# Patient Record
Sex: FEMALE | Race: WHITE | NOT HISPANIC OR LATINO | Employment: UNEMPLOYED | URBAN - METROPOLITAN AREA
[De-identification: names, ages, dates, MRNs, and addresses within clinical notes are randomized per-mention and may not be internally consistent; named-entity substitution may affect disease eponyms.]

---

## 2024-05-14 ENCOUNTER — HOSPITAL ENCOUNTER (INPATIENT)
Facility: HOSPITAL | Age: 59
LOS: 1 days | Discharge: HOME | DRG: 322 | End: 2024-05-15
Attending: EMERGENCY MEDICINE | Admitting: STUDENT IN AN ORGANIZED HEALTH CARE EDUCATION/TRAINING PROGRAM
Payer: COMMERCIAL

## 2024-05-14 ENCOUNTER — CLINICAL SUPPORT (OUTPATIENT)
Dept: CARDIOLOGY | Facility: HOSPITAL | Age: 59
DRG: 322 | End: 2024-05-14
Attending: STUDENT IN AN ORGANIZED HEALTH CARE EDUCATION/TRAINING PROGRAM
Payer: COMMERCIAL

## 2024-05-14 VITALS — BODY MASS INDEX: 36.57 KG/M2 | WEIGHT: 206.38 LBS | HEIGHT: 63 IN

## 2024-05-14 DIAGNOSIS — I21.3 STEMI (ST ELEVATION MYOCARDIAL INFARCTION): Primary | ICD-10-CM

## 2024-05-14 DIAGNOSIS — I25.10 CAD (CORONARY ARTERY DISEASE): ICD-10-CM

## 2024-05-14 DIAGNOSIS — R07.9 CHEST PAIN: ICD-10-CM

## 2024-05-14 PROBLEM — F17.210 CIGARETTE NICOTINE DEPENDENCE WITHOUT COMPLICATION: Status: ACTIVE | Noted: 2024-05-14

## 2024-05-14 PROBLEM — F41.9 ANXIETY AND DEPRESSION: Status: ACTIVE | Noted: 2024-05-14

## 2024-05-14 PROBLEM — M19.90 OSTEOARTHRITIS: Status: ACTIVE | Noted: 2024-05-14

## 2024-05-14 PROBLEM — I10 HTN (HYPERTENSION): Status: ACTIVE | Noted: 2024-05-14

## 2024-05-14 PROBLEM — F32.A ANXIETY AND DEPRESSION: Status: ACTIVE | Noted: 2024-05-14

## 2024-05-14 PROBLEM — E78.5 HLD (HYPERLIPIDEMIA): Status: ACTIVE | Noted: 2024-05-14

## 2024-05-14 LAB
ALBUMIN SERPL BCP-MCNC: 4.1 G/DL (ref 3.5–5.2)
ALP SERPL-CCNC: 88 U/L (ref 55–135)
ALT SERPL W/O P-5'-P-CCNC: 28 U/L (ref 10–44)
ANION GAP SERPL CALC-SCNC: 3 MMOL/L (ref 8–16)
AST SERPL-CCNC: 25 U/L (ref 10–40)
BASOPHILS # BLD AUTO: 0.04 K/UL (ref 0–0.2)
BASOPHILS NFR BLD: 0.4 % (ref 0–1.9)
BILIRUB SERPL-MCNC: 0.3 MG/DL (ref 0.1–1)
BNP SERPL-MCNC: 14 PG/ML (ref 0–99)
BUN SERPL-MCNC: 23 MG/DL (ref 8–23)
CALCIUM SERPL-MCNC: 9 MG/DL (ref 8.7–10.5)
CHLORIDE SERPL-SCNC: 106 MMOL/L (ref 95–110)
CHOLEST SERPL-MCNC: 183 MG/DL (ref 120–199)
CHOLEST/HDLC SERPL: 3.7 {RATIO} (ref 2–5)
CO2 SERPL-SCNC: 30 MMOL/L (ref 23–29)
CREAT SERPL-MCNC: 0.9 MG/DL (ref 0.5–1.4)
DIFFERENTIAL METHOD BLD: ABNORMAL
EOSINOPHIL # BLD AUTO: 0.3 K/UL (ref 0–0.5)
EOSINOPHIL NFR BLD: 3.1 % (ref 0–8)
ERYTHROCYTE [DISTWIDTH] IN BLOOD BY AUTOMATED COUNT: 13.7 % (ref 11.5–14.5)
EST. GFR  (NO RACE VARIABLE): >60 ML/MIN/1.73 M^2
ESTIMATED AVG GLUCOSE: 126 MG/DL (ref 68–131)
GLUCOSE SERPL-MCNC: 115 MG/DL (ref 70–110)
HBA1C MFR BLD: 6 % (ref 4.5–6.2)
HCT VFR BLD AUTO: 43.6 % (ref 37–48.5)
HDLC SERPL-MCNC: 49 MG/DL (ref 40–75)
HDLC SERPL: 26.8 % (ref 20–50)
HGB BLD-MCNC: 13.6 G/DL (ref 12–16)
IMM GRANULOCYTES # BLD AUTO: 0.03 K/UL (ref 0–0.04)
IMM GRANULOCYTES NFR BLD AUTO: 0.3 % (ref 0–0.5)
INR PPP: 0.9 (ref 0.8–1.2)
LDLC SERPL CALC-MCNC: 124.8 MG/DL (ref 63–159)
LYMPHOCYTES # BLD AUTO: 3.3 K/UL (ref 1–4.8)
LYMPHOCYTES NFR BLD: 33.1 % (ref 18–48)
MAGNESIUM SERPL-MCNC: 2.1 MG/DL (ref 1.6–2.6)
MCH RBC QN AUTO: 29.3 PG (ref 27–31)
MCHC RBC AUTO-ENTMCNC: 31.2 G/DL (ref 32–36)
MCV RBC AUTO: 94 FL (ref 82–98)
MONOCYTES # BLD AUTO: 1.2 K/UL (ref 0.3–1)
MONOCYTES NFR BLD: 12 % (ref 4–15)
NEUTROPHILS # BLD AUTO: 5.1 K/UL (ref 1.8–7.7)
NEUTROPHILS NFR BLD: 51.1 % (ref 38–73)
NONHDLC SERPL-MCNC: 134 MG/DL
NRBC BLD-RTO: 0 /100 WBC
PLATELET # BLD AUTO: 247 K/UL (ref 150–450)
PMV BLD AUTO: 10.2 FL (ref 9.2–12.9)
POC ACTIVATED CLOTTING TIME K: 239 SEC (ref 74–137)
POTASSIUM SERPL-SCNC: 4 MMOL/L (ref 3.5–5.1)
PROT SERPL-MCNC: 6.7 G/DL (ref 6–8.4)
PROTHROMBIN TIME: 9.8 SEC (ref 9–12.5)
RBC # BLD AUTO: 4.64 M/UL (ref 4–5.4)
SAMPLE: ABNORMAL
SODIUM SERPL-SCNC: 139 MMOL/L (ref 136–145)
TRIGL SERPL-MCNC: 46 MG/DL (ref 30–150)
TROPONIN I SERPL HS-MCNC: 11.4 PG/ML (ref 0–14.9)
TROPONIN I SERPL HS-MCNC: 7108.2 PG/ML (ref 0–14.9)
TROPONIN I SERPL HS-MCNC: 7108.2 PG/ML (ref 0–14.9)
WBC # BLD AUTO: 10.03 K/UL (ref 3.9–12.7)

## 2024-05-14 PROCEDURE — 92941 PRQ TRLML REVSC TOT OCCL AMI: CPT | Mod: LD,,, | Performed by: INTERNAL MEDICINE

## 2024-05-14 PROCEDURE — 63600175 PHARM REV CODE 636 W HCPCS: Mod: JZ,JG | Performed by: INTERNAL MEDICINE

## 2024-05-14 PROCEDURE — 99152 MOD SED SAME PHYS/QHP 5/>YRS: CPT | Performed by: INTERNAL MEDICINE

## 2024-05-14 PROCEDURE — C1769 GUIDE WIRE: HCPCS | Performed by: INTERNAL MEDICINE

## 2024-05-14 PROCEDURE — 93306 TTE W/DOPPLER COMPLETE: CPT

## 2024-05-14 PROCEDURE — 027034Z DILATION OF CORONARY ARTERY, ONE ARTERY WITH DRUG-ELUTING INTRALUMINAL DEVICE, PERCUTANEOUS APPROACH: ICD-10-PCS | Performed by: INTERNAL MEDICINE

## 2024-05-14 PROCEDURE — 27000221 HC OXYGEN, UP TO 24 HOURS

## 2024-05-14 PROCEDURE — C1874 STENT, COATED/COV W/DEL SYS: HCPCS | Performed by: INTERNAL MEDICINE

## 2024-05-14 PROCEDURE — 83735 ASSAY OF MAGNESIUM: CPT | Performed by: EMERGENCY MEDICINE

## 2024-05-14 PROCEDURE — 94761 N-INVAS EAR/PLS OXIMETRY MLT: CPT

## 2024-05-14 PROCEDURE — B211YZZ FLUOROSCOPY OF MULTIPLE CORONARY ARTERIES USING OTHER CONTRAST: ICD-10-PCS | Performed by: INTERNAL MEDICINE

## 2024-05-14 PROCEDURE — 96375 TX/PRO/DX INJ NEW DRUG ADDON: CPT

## 2024-05-14 PROCEDURE — 99900031 HC PATIENT EDUCATION (STAT)

## 2024-05-14 PROCEDURE — 84484 ASSAY OF TROPONIN QUANT: CPT | Performed by: EMERGENCY MEDICINE

## 2024-05-14 PROCEDURE — C1887 CATHETER, GUIDING: HCPCS | Performed by: INTERNAL MEDICINE

## 2024-05-14 PROCEDURE — 93454 CORONARY ARTERY ANGIO S&I: CPT | Mod: 26,59,51, | Performed by: INTERNAL MEDICINE

## 2024-05-14 PROCEDURE — 25500020 PHARM REV CODE 255: Performed by: INTERNAL MEDICINE

## 2024-05-14 PROCEDURE — 99291 CRITICAL CARE FIRST HOUR: CPT

## 2024-05-14 PROCEDURE — 85610 PROTHROMBIN TIME: CPT | Performed by: EMERGENCY MEDICINE

## 2024-05-14 PROCEDURE — 93005 ELECTROCARDIOGRAM TRACING: CPT | Performed by: INTERNAL MEDICINE

## 2024-05-14 PROCEDURE — 83036 HEMOGLOBIN GLYCOSYLATED A1C: CPT | Performed by: STUDENT IN AN ORGANIZED HEALTH CARE EDUCATION/TRAINING PROGRAM

## 2024-05-14 PROCEDURE — 83880 ASSAY OF NATRIURETIC PEPTIDE: CPT | Performed by: EMERGENCY MEDICINE

## 2024-05-14 PROCEDURE — C1894 INTRO/SHEATH, NON-LASER: HCPCS | Performed by: INTERNAL MEDICINE

## 2024-05-14 PROCEDURE — 25000003 PHARM REV CODE 250: Performed by: STUDENT IN AN ORGANIZED HEALTH CARE EDUCATION/TRAINING PROGRAM

## 2024-05-14 PROCEDURE — 25000242 PHARM REV CODE 250 ALT 637 W/ HCPCS

## 2024-05-14 PROCEDURE — 85025 COMPLETE CBC W/AUTO DIFF WBC: CPT | Performed by: EMERGENCY MEDICINE

## 2024-05-14 PROCEDURE — 63600175 PHARM REV CODE 636 W HCPCS

## 2024-05-14 PROCEDURE — C1725 CATH, TRANSLUMIN NON-LASER: HCPCS | Performed by: INTERNAL MEDICINE

## 2024-05-14 PROCEDURE — 99223 1ST HOSP IP/OBS HIGH 75: CPT | Mod: 25,,, | Performed by: INTERNAL MEDICINE

## 2024-05-14 PROCEDURE — 84484 ASSAY OF TROPONIN QUANT: CPT | Mod: 91 | Performed by: STUDENT IN AN ORGANIZED HEALTH CARE EDUCATION/TRAINING PROGRAM

## 2024-05-14 PROCEDURE — 80061 LIPID PANEL: CPT | Performed by: STUDENT IN AN ORGANIZED HEALTH CARE EDUCATION/TRAINING PROGRAM

## 2024-05-14 PROCEDURE — 20000000 HC ICU ROOM

## 2024-05-14 PROCEDURE — 93306 TTE W/DOPPLER COMPLETE: CPT | Mod: 26,,, | Performed by: INTERNAL MEDICINE

## 2024-05-14 PROCEDURE — 25000003 PHARM REV CODE 250: Performed by: INTERNAL MEDICINE

## 2024-05-14 PROCEDURE — 93454 CORONARY ARTERY ANGIO S&I: CPT | Mod: 59 | Performed by: INTERNAL MEDICINE

## 2024-05-14 PROCEDURE — 99153 MOD SED SAME PHYS/QHP EA: CPT | Performed by: INTERNAL MEDICINE

## 2024-05-14 PROCEDURE — 96374 THER/PROPH/DIAG INJ IV PUSH: CPT

## 2024-05-14 PROCEDURE — 93010 ELECTROCARDIOGRAM REPORT: CPT | Mod: ,,, | Performed by: INTERNAL MEDICINE

## 2024-05-14 PROCEDURE — C9606 PERC D-E COR REVASC W AMI S: HCPCS | Mod: LD | Performed by: INTERNAL MEDICINE

## 2024-05-14 PROCEDURE — 25000242 PHARM REV CODE 250 ALT 637 W/ HCPCS: Performed by: EMERGENCY MEDICINE

## 2024-05-14 PROCEDURE — 99152 MOD SED SAME PHYS/QHP 5/>YRS: CPT | Mod: ,,, | Performed by: INTERNAL MEDICINE

## 2024-05-14 PROCEDURE — C1760 CLOSURE DEV, VASC: HCPCS | Performed by: INTERNAL MEDICINE

## 2024-05-14 PROCEDURE — 36415 COLL VENOUS BLD VENIPUNCTURE: CPT | Performed by: STUDENT IN AN ORGANIZED HEALTH CARE EDUCATION/TRAINING PROGRAM

## 2024-05-14 PROCEDURE — 80053 COMPREHEN METABOLIC PANEL: CPT | Performed by: EMERGENCY MEDICINE

## 2024-05-14 PROCEDURE — 27201423 OPTIME MED/SURG SUP & DEVICES STERILE SUPPLY: Performed by: INTERNAL MEDICINE

## 2024-05-14 DEVICE — STENT ONYXNG25030UX ONYX 2.50X30RX
Type: IMPLANTABLE DEVICE | Site: CORONARY | Status: FUNCTIONAL
Brand: ONYX FRONTIER™

## 2024-05-14 DEVICE — ANGIO-SEAL VIP VASCULAR CLOSURE DEVICE
Type: IMPLANTABLE DEVICE | Site: CORONARY | Status: FUNCTIONAL
Brand: ANGIO-SEAL

## 2024-05-14 RX ORDER — CLOPIDOGREL BISULFATE 75 MG/1
75 TABLET ORAL DAILY
Status: DISCONTINUED | OUTPATIENT
Start: 2024-05-15 | End: 2024-05-15 | Stop reason: HOSPADM

## 2024-05-14 RX ORDER — CARVEDILOL 25 MG/1
25 TABLET ORAL 2 TIMES DAILY
COMMUNITY

## 2024-05-14 RX ORDER — OXYCODONE AND ACETAMINOPHEN 10; 325 MG/1; MG/1
1 TABLET ORAL EVERY 8 HOURS
COMMUNITY

## 2024-05-14 RX ORDER — ONDANSETRON HYDROCHLORIDE 2 MG/ML
4 INJECTION, SOLUTION INTRAVENOUS EVERY 12 HOURS PRN
Status: DISCONTINUED | OUTPATIENT
Start: 2024-05-14 | End: 2024-05-15 | Stop reason: HOSPADM

## 2024-05-14 RX ORDER — LIDOCAINE HYDROCHLORIDE 10 MG/ML
INJECTION INFILTRATION; PERINEURAL
Status: DISCONTINUED | OUTPATIENT
Start: 2024-05-14 | End: 2024-05-14

## 2024-05-14 RX ORDER — PHENTERMINE HYDROCHLORIDE 37.5 MG/1
37.5 TABLET ORAL DAILY
Status: ON HOLD | COMMUNITY
End: 2024-05-15 | Stop reason: HOSPADM

## 2024-05-14 RX ORDER — ALUMINUM HYDROXIDE, MAGNESIUM HYDROXIDE, AND SIMETHICONE 1200; 120; 1200 MG/30ML; MG/30ML; MG/30ML
30 SUSPENSION ORAL EVERY 6 HOURS PRN
Status: DISCONTINUED | OUTPATIENT
Start: 2024-05-14 | End: 2024-05-15 | Stop reason: HOSPADM

## 2024-05-14 RX ORDER — NITROGLYCERIN 5 MG/ML
INJECTION, SOLUTION INTRAVENOUS
Status: DISCONTINUED | OUTPATIENT
Start: 2024-05-14 | End: 2024-05-14

## 2024-05-14 RX ORDER — FENTANYL CITRATE 50 UG/ML
INJECTION, SOLUTION INTRAMUSCULAR; INTRAVENOUS
Status: DISCONTINUED | OUTPATIENT
Start: 2024-05-14 | End: 2024-05-14

## 2024-05-14 RX ORDER — ONDANSETRON HYDROCHLORIDE 2 MG/ML
INJECTION, SOLUTION INTRAVENOUS
Status: COMPLETED
Start: 2024-05-14 | End: 2024-05-14

## 2024-05-14 RX ORDER — MIDAZOLAM HYDROCHLORIDE 1 MG/ML
INJECTION INTRAMUSCULAR; INTRAVENOUS
Status: DISCONTINUED | OUTPATIENT
Start: 2024-05-14 | End: 2024-05-14

## 2024-05-14 RX ORDER — PREGABALIN 25 MG/1
50 CAPSULE ORAL 2 TIMES DAILY
Status: DISCONTINUED | OUTPATIENT
Start: 2024-05-14 | End: 2024-05-15 | Stop reason: HOSPADM

## 2024-05-14 RX ORDER — SUMATRIPTAN SUCCINATE 100 MG/1
50 TABLET ORAL
COMMUNITY

## 2024-05-14 RX ORDER — ACETAMINOPHEN 325 MG/1
650 TABLET ORAL EVERY 4 HOURS PRN
Status: DISCONTINUED | OUTPATIENT
Start: 2024-05-14 | End: 2024-05-14

## 2024-05-14 RX ORDER — ACETAMINOPHEN 325 MG/1
650 TABLET ORAL EVERY 6 HOURS PRN
Status: DISCONTINUED | OUTPATIENT
Start: 2024-05-14 | End: 2024-05-15 | Stop reason: HOSPADM

## 2024-05-14 RX ORDER — SODIUM CHLORIDE 9 MG/ML
INJECTION, SOLUTION INTRAVENOUS
Status: DISCONTINUED | OUTPATIENT
Start: 2024-05-14 | End: 2024-05-14

## 2024-05-14 RX ORDER — HEPARIN SODIUM 10000 [USP'U]/ML
INJECTION, SOLUTION INTRAVENOUS; SUBCUTANEOUS
Status: DISCONTINUED | OUTPATIENT
Start: 2024-05-14 | End: 2024-05-14

## 2024-05-14 RX ORDER — MUPIROCIN 20 MG/G
OINTMENT TOPICAL 2 TIMES DAILY
Status: DISCONTINUED | OUTPATIENT
Start: 2024-05-14 | End: 2024-05-15 | Stop reason: HOSPADM

## 2024-05-14 RX ORDER — PREGABALIN 50 MG/1
50 CAPSULE ORAL 2 TIMES DAILY
COMMUNITY

## 2024-05-14 RX ORDER — CYCLOBENZAPRINE HCL 5 MG
10 TABLET ORAL EVERY 8 HOURS PRN
Status: DISCONTINUED | OUTPATIENT
Start: 2024-05-14 | End: 2024-05-14

## 2024-05-14 RX ORDER — PANTOPRAZOLE SODIUM 40 MG/1
40 TABLET, DELAYED RELEASE ORAL DAILY
Status: DISCONTINUED | OUTPATIENT
Start: 2024-05-14 | End: 2024-05-15 | Stop reason: HOSPADM

## 2024-05-14 RX ORDER — ASPIRIN 81 MG/1
81 TABLET ORAL DAILY
Status: DISCONTINUED | OUTPATIENT
Start: 2024-05-15 | End: 2024-05-15 | Stop reason: HOSPADM

## 2024-05-14 RX ORDER — DULOXETIN HYDROCHLORIDE 60 MG/1
60 CAPSULE, DELAYED RELEASE ORAL 2 TIMES DAILY
COMMUNITY

## 2024-05-14 RX ORDER — NITROGLYCERIN 0.4 MG/1
TABLET SUBLINGUAL
Status: COMPLETED
Start: 2024-05-14 | End: 2024-05-14

## 2024-05-14 RX ORDER — NITROGLYCERIN 0.4 MG/1
0.4 TABLET SUBLINGUAL EVERY 5 MIN PRN
Status: DISCONTINUED | OUTPATIENT
Start: 2024-05-14 | End: 2024-05-15 | Stop reason: HOSPADM

## 2024-05-14 RX ORDER — CELECOXIB 200 MG/1
200 CAPSULE ORAL 2 TIMES DAILY
Status: ON HOLD | COMMUNITY
End: 2024-05-15 | Stop reason: HOSPADM

## 2024-05-14 RX ORDER — DULOXETIN HYDROCHLORIDE 30 MG/1
60 CAPSULE, DELAYED RELEASE ORAL 2 TIMES DAILY
Status: DISCONTINUED | OUTPATIENT
Start: 2024-05-14 | End: 2024-05-15 | Stop reason: HOSPADM

## 2024-05-14 RX ORDER — ATORVASTATIN CALCIUM 40 MG/1
40 TABLET, FILM COATED ORAL NIGHTLY
Status: DISCONTINUED | OUTPATIENT
Start: 2024-05-14 | End: 2024-05-15 | Stop reason: HOSPADM

## 2024-05-14 RX ORDER — CYCLOBENZAPRINE HCL 10 MG
10 TABLET ORAL EVERY 8 HOURS PRN
COMMUNITY

## 2024-05-14 RX ORDER — SPIRONOLACTONE 25 MG/1
25 TABLET ORAL DAILY
COMMUNITY

## 2024-05-14 RX ORDER — ATORVASTATIN CALCIUM 20 MG/1
20 TABLET, FILM COATED ORAL DAILY
COMMUNITY

## 2024-05-14 RX ORDER — MORPHINE SULFATE 4 MG/ML
INJECTION, SOLUTION INTRAMUSCULAR; INTRAVENOUS
Status: COMPLETED
Start: 2024-05-14 | End: 2024-05-14

## 2024-05-14 RX ORDER — MORPHINE SULFATE 4 MG/ML
4 INJECTION, SOLUTION INTRAMUSCULAR; INTRAVENOUS
Status: COMPLETED | OUTPATIENT
Start: 2024-05-14 | End: 2024-05-14

## 2024-05-14 RX ORDER — CARVEDILOL 12.5 MG/1
25 TABLET ORAL 2 TIMES DAILY
Status: DISCONTINUED | OUTPATIENT
Start: 2024-05-14 | End: 2024-05-15 | Stop reason: HOSPADM

## 2024-05-14 RX ORDER — SPIRONOLACTONE 25 MG/1
25 TABLET ORAL DAILY
Status: DISCONTINUED | OUTPATIENT
Start: 2024-05-14 | End: 2024-05-15 | Stop reason: HOSPADM

## 2024-05-14 RX ORDER — IODIXANOL 320 MG/ML
INJECTION, SOLUTION INTRAVASCULAR
Status: DISCONTINUED | OUTPATIENT
Start: 2024-05-14 | End: 2024-05-14

## 2024-05-14 RX ORDER — SODIUM CHLORIDE 450 MG/100ML
INJECTION, SOLUTION INTRAVENOUS CONTINUOUS
Status: ACTIVE | OUTPATIENT
Start: 2024-05-14 | End: 2024-05-14

## 2024-05-14 RX ORDER — FAMOTIDINE 20 MG/1
20 TABLET, FILM COATED ORAL 2 TIMES DAILY
Status: DISCONTINUED | OUTPATIENT
Start: 2024-05-14 | End: 2024-05-14

## 2024-05-14 RX ORDER — ONDANSETRON HYDROCHLORIDE 2 MG/ML
4 INJECTION, SOLUTION INTRAVENOUS
Status: COMPLETED | OUTPATIENT
Start: 2024-05-14 | End: 2024-05-14

## 2024-05-14 RX ORDER — VALSARTAN 80 MG/1
160 TABLET ORAL DAILY
Status: DISCONTINUED | OUTPATIENT
Start: 2024-05-14 | End: 2024-05-15 | Stop reason: HOSPADM

## 2024-05-14 RX ORDER — NITROGLYCERIN 0.4 MG/1
0.4 TABLET SUBLINGUAL EVERY 5 MIN PRN
Status: DISCONTINUED | OUTPATIENT
Start: 2024-05-14 | End: 2024-05-14

## 2024-05-14 RX ORDER — CLOPIDOGREL BISULFATE 75 MG/1
TABLET ORAL
Status: DISCONTINUED | OUTPATIENT
Start: 2024-05-14 | End: 2024-05-14

## 2024-05-14 RX ORDER — CELECOXIB 100 MG/1
200 CAPSULE ORAL 2 TIMES DAILY
Status: DISCONTINUED | OUTPATIENT
Start: 2024-05-14 | End: 2024-05-14

## 2024-05-14 RX ORDER — VALSARTAN 160 MG/1
160 TABLET ORAL DAILY
COMMUNITY

## 2024-05-14 RX ORDER — OMEPRAZOLE 40 MG/1
40 CAPSULE, DELAYED RELEASE ORAL EVERY MORNING
COMMUNITY

## 2024-05-14 RX ADMIN — NITROGLYCERIN 0.4 MG: 0.4 TABLET SUBLINGUAL at 04:05

## 2024-05-14 RX ADMIN — MORPHINE SULFATE 4 MG: 4 INJECTION, SOLUTION INTRAMUSCULAR; INTRAVENOUS at 04:05

## 2024-05-14 RX ADMIN — ONDANSETRON HYDROCHLORIDE 4 MG: 2 INJECTION, SOLUTION INTRAVENOUS at 04:05

## 2024-05-14 RX ADMIN — DULOXETINE HYDROCHLORIDE 60 MG: 30 CAPSULE, DELAYED RELEASE ORAL at 08:05

## 2024-05-14 RX ADMIN — FAMOTIDINE 20 MG: 20 TABLET ORAL at 08:05

## 2024-05-14 RX ADMIN — PREGABALIN 50 MG: 25 CAPSULE ORAL at 08:05

## 2024-05-14 RX ADMIN — SODIUM CHLORIDE: 0.45 INJECTION, SOLUTION INTRAVENOUS at 08:05

## 2024-05-14 RX ADMIN — MUPIROCIN 1 G: 20 OINTMENT TOPICAL at 08:05

## 2024-05-14 RX ADMIN — ACETAMINOPHEN 650 MG: 325 TABLET ORAL at 10:05

## 2024-05-14 RX ADMIN — ATORVASTATIN CALCIUM 40 MG: 40 TABLET, FILM COATED ORAL at 08:05

## 2024-05-14 RX ADMIN — ONDANSETRON 4 MG: 2 INJECTION INTRAMUSCULAR; INTRAVENOUS at 04:05

## 2024-05-14 RX ADMIN — CARVEDILOL 25 MG: 12.5 TABLET, FILM COATED ORAL at 08:05

## 2024-05-14 NOTE — Clinical Note
The catheter was inserted over the wire into the ostium   left main. An angiography was performed of the left coronary arteries. Multiple views were taken. The angiography was performed via hand injection with 10 mL of contrast.  Contrast estimated

## 2024-05-14 NOTE — PLAN OF CARE
[facility]  Initial Discharge Assessment    Assessment completed at bedside with Pt, and all information on FaceSheet confirmed, including demographics, PCP, pharmacy and insurance. Pt has not addressed advance directives. She currently lives with her spouse in [place]. Her PCP is Jesse Leggett MD, and last appointment was three weeks ago. Her preferred pharmacy is [facility] in [place]. She denies any HH/HD/Blood Thinners but she does use a walker and a cane. For transportation to appointments, she usually drives herself but her sister-in-law will transport her back home at discharge. She denies any recent hospitalizations. Plan for discharge is to return home. Case Management to continue to follow for discharge planning needs.          Primary Care Provider: Jesse Leggett MD    Admission Diagnosis: STEMI (ST elevation myocardial infarction) [I21.3]  Chest pain [R07.9]    Admission Date: 5/14/2024  Expected Discharge Date: 5/15/2024    Transition of Care Barriers: (P) None    Payor: CIGNA / Plan: CIGNA EPO / Product Type: PPO /     Extended Emergency Contact Information  Primary Emergency Contact: AnnaDavid  Address: [address] [place] [id number] [facility]  Home Phone: [phone]  Mobile Phone: [phone]  Relation: Spouse  Preferred language: English   needed? No  Secondary Emergency Contact: Jean-Pierre Sam  Home Phone: [phone]  Mobile Phone: [phone]  Relation: Son   needed? No    Discharge Plan A: (P) Home with family  Discharge Plan B: (P) Home with family    No Pharmacies Listed    Initial Assessment (most recent)       Adult Discharge Assessment - 05/14/24 1444          Discharge Assessment    Assessment Type Discharge Planning Assessment (P)      Confirmed/corrected address, phone number and insurance Yes (P)      Confirmed Demographics Correct on Facesheet (P)      Source of Information patient (P)      When was  your last doctors appointment? -- (P)    Three weeks ago    Does patient/caregiver understand observation status Yes (P)      Reason For Admission STEMI (P)      People in Home spouse (P)      Do you expect to return to your current living situation? Yes (P)      Do you have help at home or someone to help you manage your care at home? Yes (P)      Who are your caregiver(s) and their phone number(s)? David Anna (Spouse)  [phone] (Mobile) (P)      Prior to hospitilization cognitive status: Alert/Oriented (P)      Current cognitive status: Alert/Oriented (P)      Walking or Climbing Stairs Difficulty yes (P)      Walking or Climbing Stairs ambulation difficulty, requires equipment;stair climbing difficulty, requires equipment (P)      Dressing/Bathing Difficulty no (P)      Home Accessibility not wheelchair accessible (P)      Home Layout Able to live on 1st floor (P)      Equipment Currently Used at Home walker, standard;cane, straight (P)      Readmission within 30 days? No (P)      Patient currently being followed by outpatient case management? No (P)      Do you currently have service(s) that help you manage your care at home? No (P)      Do you take prescription medications? Yes (P)      Do you have prescription coverage? Yes (P)      Coverage CIG[employer] - CIGNA EPO (P)      Do you have any problems affording any of your prescribed medications? No (P)      Is the patient taking medications as prescribed? yes (P)      Who is going to help you get home at discharge? David Anna (Spouse)  [phone] (Mobile) (P)      Are you on dialysis? No (P)      Do you take coumadin? No (P)      Discharge Plan A Home with family (P)      Discharge Plan B Home with family (P)      DME Needed Upon Discharge  none (P)      Discharge Plan discussed with: Patient (P)      Transition of Care Barriers None (P)         OTHER    Name(s) of People in Home ShoshanaDavid (Spouse)  [phone] (Mobile) (P)

## 2024-05-14 NOTE — ASSESSMENT & PLAN NOTE
S/p stent to LAD  - post-cath care  - tele  - start DAPT and increased statin  - continue coreg and valsartan  - cardiology following

## 2024-05-14 NOTE — CARE UPDATE
05/14/24 0816   Patient Assessment/Suction   Level of Consciousness (AVPU) alert   Respiratory Effort Normal;Unlabored   Expansion/Accessory Muscles/Retractions no use of accessory muscles   All Lung Fields Breath Sounds diminished   Rhythm/Pattern, Respiratory unlabored   Cough Frequency no cough   PRE-TX-O2   Device (Oxygen Therapy) room air   SpO2 98 %   Pulse Oximetry Type Intermittent   $ Pulse Oximetry - Multiple Charge Pulse Oximetry - Multiple   Oximetry Probe Status Assessed   Pulse 76   Resp (!) 21   Education   $ Education DME Oxygen;15 min

## 2024-05-14 NOTE — CONSULTS
[facility]  Cardiology  Consult Note    Patient Name: Sherry Anna  MRN: [id number]  Admission Date: 5/14/2024  Hospital Length of Stay: 0 days  Code Status: No Order   Attending Provider: Isai Coffman MD  Consulting Provider: Isai Coffman MD  Primary Care Physician: Jesse Leggett MD  Principal Problem:<principal problem not specified>    Patient information was obtained from parent and ER records.     Consults  Subjective:     Chief Complaint:  Chest pain    HPI:  Ms. Anna is a 59-year-old female with history of hypertension, diabetes, tobacco addiction.  She has been complaining of intermittent episodes of chest pain that at times occurs at rest.  She was evaluated by a cardiologist in [place], [place].  A stress test was performed a proximally 3 weeks ago it was negative for ischemia.  She woke up with severe chest pain at around 1:00 a.m. she called the family members and she was brought into the emergency room.  She was noted to have ST-elevation in the anterior leads with ST depression in the inferior leads.  She was taking immediately to the cardiac catheterization table the left anterior descending artery had a 99% occlusion a 2.5 x 30 mm stent was deployed in the LAD with good results.  The ostium of the LAD has a proximally 50% stenosis.  The patient's symptoms have resolved.    No past medical history on file.    No past surgical history on file.    Review of patient's allergies indicates:   Allergen Reactions    Codeine Itching       No current facility-administered medications on file prior to encounter.     No current outpatient medications on file prior to encounter.     Family History    None       Tobacco Use    Smoking status: Not on file    Smokeless tobacco: Not on file   Substance and Sexual Activity    Alcohol use: Not on file    Drug use: Not on file    Sexual activity: Not on file     Review of Systems   Cardiovascular:  Positive for chest pain and  dyspnea on exertion.   Respiratory:  Negative for cough and shortness of breath.    Hematologic/Lymphatic: Negative for bleeding problem. Does not bruise/bleed easily.     Objective:     Vital Signs (Most Recent):  Temp: 97.8 °F (36.6 °C) (05/14/24 0423)  Pulse: 79 (05/14/24 0445)  Resp: 18 (05/14/24 0438)  BP: 116/62 (05/14/24 0445)  SpO2: 100 % (05/14/24 0445) Vital Signs (24h Range):  Temp:  [97.8 °F (36.6 °C)] 97.8 °F (36.6 °C)  Pulse:  [75-79] 79  Resp:  [18-20] 18  SpO2:  [100 %] 100 %  BP: (116-155)/(62-85) 116/62     Weight: 86.2 kg (190 lb)  Body mass index is 33.66 kg/m².    SpO2: 100 %       No intake or output data in the 24 hours ending 05/14/24 0653    Lines/Drains/Airways       Peripheral Intravenous Line  Duration                  Peripheral IV - Single Lumen 05/14/24 0417 20 G Left Antecubital <1 day         Peripheral IV - Single Lumen 05/14/24 0434 20 G Right Antecubital <1 day                    Physical Exam  Vitals and nursing note reviewed.   Constitutional:       Appearance: She is well-developed. She is obese.      Comments: Overweight female that appears to be in pain   HENT:      Head: Normocephalic and atraumatic.   Eyes:      Conjunctiva/sclera: Conjunctivae normal.   Cardiovascular:      Rate and Rhythm: Normal rate and regular rhythm.      Heart sounds: Normal heart sounds.   Pulmonary:      Effort: Pulmonary effort is normal.      Breath sounds: Normal breath sounds.   Abdominal:      General: Bowel sounds are normal.      Palpations: Abdomen is soft.   Musculoskeletal:         General: Normal range of motion.   Skin:     General: Skin is warm and dry.   Neurological:      Mental Status: She is alert and oriented to person, place, and time.   Psychiatric:         Behavior: Behavior normal.         Thought Content: Thought content normal.         Judgment: Judgment normal.       Significant Labs: CMP   Recent Labs   Lab 05/14/24 0433      K 4.0      CO2 30*   *    BUN 23   CREATININE 0.9   CALCIUM 9.0   PROT 6.7   ALBUMIN 4.1   BILITOT 0.3   ALKPHOS 88   AST 25   ALT 28   ANIONGAP 3*   , CBC   Recent Labs   Lab 05/14/24  0433   WBC 10.03   HGB 13.6   HCT 43.6      , and   Recent Lab Results         05/14/24  0628 05/14/24  0433        Albumin   4.1       ALP   88       ALT   28       Anion Gap   3       AST   25       Baso #   0.04       Basophil %   0.4       BILIRUBIN TOTAL   0.3  Comment: For infants and newborns, interpretation of results should be based  on gestational age, weight and in agreement with clinical  observations.    Premature Infant recommended reference ranges:  Up to 24 hours.............<8.0 mg/dL  Up to 48 hours............<12.0 mg/dL  3-5 days..................<15.0 mg/dL  6-29 days.................<15.0 mg/dL         BNP   14  Comment: Values of less than 100 pg/ml are consistent with non-CHF populations.       BUN   23       Calcium   9.0       Chloride   106       CO2   30       Creatinine   0.9       Differential Method   Automated       eGFR   >60.0       Eos #   0.3       Eos %   3.1       Glucose   115       Gran # (ANC)   5.1       Gran %   51.1       Hematocrit   43.6       Hemoglobin   13.6       Immature Grans (Abs)   0.03  Comment: Mild elevation in immature granulocytes is non specific and   can be seen in a variety of conditions including stress response,   acute inflammation, trauma and pregnancy. Correlation with other   laboratory and clinical findings is essential.         Immature Granulocytes   0.3       INR   0.9  Comment: Coumadin Therapy:  2.0 - 3.0 for INR for all indicators except mechanical heart valves  and antiphospholipid syndromes which should use 2.5 - 3.5.         Lymph #   3.3       Lymph %   33.1       Magnesium    2.1       MCH   29.3       MCHC   31.2       MCV   94       Mono #   1.2       Mono %   12.0       MPV   10.2       nRBC   0       Platelet Count   247       POC ACTIVATED CLOTTING TIME K 239          Potassium   4.0       PROTEIN TOTAL   6.7       PT   9.8       RBC   4.64       RDW   13.7       Sample ARTERIAL         Sodium   139       Troponin I High Sensitivity   11.4  Comment: Troponin results differ between methods. Do not use   results between Troponin methods interchangeably.    Alkaline Phospatase levels above 400 U/L may   cause false positive results.    Access hsTnI should not be used for patients taking   Asfotase rani (Strensiq).         WBC   10.03               Significant Imaging: X-Ray: CXR: X-Ray Chest 1 View (CXR): No results found for this visit on 05/14/24.  Assessment and Plan:   STEMI  Coronary artery disease subtotal occlusion of the midportion of the left anterior descending artery  Status post percutaneous revascularization of the LAD  Hypertension  Tobacco dependence  There are no hospital problems to display for this patient.      VTE Risk Mitigation (From admission, onward)           Ordered     heparin (porcine) injection  As needed (PRN)         05/14/24 0551                Continue routine post intervention.  Dual antiplatelets for a year evaluate for ACE inhibitor beta-blockers and statins for secondary prevention    Thank you for your consult. I will follow-up with patient. Please contact us if you have any additional questions.    Isai Coffman MD  Cardiology   Replaced by [facility]

## 2024-05-14 NOTE — ED PROVIDER NOTES
Encounter Date: 5/14/2024       History     Chief Complaint   Patient presents with    Chest Pain    Nausea     Pt presents to ED c/o chest pain, n/v since 0200 that woke her up from her sleep. Given 4mg IV morphine, 1SL Nitro, 4mg IV Zofran via EMS pta. Pt took (4) 81mg ASA      Chief complaint is midsternal chest pain.  She can not describe it clearly but states it is very severe.  It radiates to left arm and started at 2:00 a.m..  It woke her up from sleep.  She took 4 baby aspirin and EN route EMS gave her 4 mg of morphine 4 mg of Zofran.  On arrival here her chest pain is 10/10.  She denies any history of MI. she does have a history of hypertension.  She also has a history of smoking and has high cholesterol.        Review of patient's allergies indicates:   Allergen Reactions    Codeine Itching     No past medical history on file.  No past surgical history on file.  No family history on file.     Review of Systems   Constitutional:  Negative for chills and fever.   HENT:  Negative for ear pain, rhinorrhea and sore throat.    Eyes:  Negative for pain and visual disturbance.   Respiratory:  Negative for cough and shortness of breath.    Cardiovascular:  Positive for chest pain. Negative for palpitations.   Gastrointestinal:  Negative for abdominal pain, constipation, diarrhea, nausea and vomiting.   Genitourinary:  Negative for dysuria, frequency, hematuria and urgency.   Musculoskeletal:  Negative for back pain, joint swelling and myalgias.   Skin:  Negative for rash.   Neurological:  Negative for dizziness, seizures, weakness and headaches.   Psychiatric/Behavioral:  Negative for dysphoric mood. The patient is not nervous/anxious.        Physical Exam     Initial Vitals [05/14/24 0423]   BP Pulse Resp Temp SpO2   133/84 75 20 97.8 °F (36.6 °C) 100 %      MAP       --         Physical Exam    Nursing note and vitals reviewed.  Constitutional: She appears well-developed and well-nourished.   HENT:   Head:  Normocephalic and atraumatic.   Eyes: Conjunctivae, EOM and lids are normal. Pupils are equal, round, and reactive to light.   Neck: Trachea normal. Neck supple. No thyroid mass and no thyromegaly present.   Normal range of motion.  Cardiovascular:  Normal rate, regular rhythm and normal heart sounds.           Pulmonary/Chest: Effort normal and breath sounds normal.   Abdominal: Abdomen is soft. There is no abdominal tenderness.   Musculoskeletal:         General: Normal range of motion.      Cervical back: Normal range of motion and neck supple.     Neurological: She is alert and oriented to person, place, and time. She has normal strength and normal reflexes. No cranial nerve deficit or sensory deficit.   Skin: Skin is warm and dry.   Psychiatric: She has a normal mood and affect. Her speech is normal and behavior is normal. Judgment and thought content normal.         ED Course   Procedures  Labs Reviewed   CBC W/ AUTO DIFFERENTIAL - Abnormal; Notable for the following components:       Result Value    MCHC 31.2 (*)     Mono # 1.2 (*)     All other components within normal limits   COMPREHENSIVE METABOLIC PANEL - Abnormal; Notable for the following components:    CO2 30 (*)     Glucose 115 (*)     Anion Gap 3 (*)     All other components within normal limits   B-TYPE NATRIURETIC PEPTIDE   MAGNESIUM   TROPONIN I HIGH SENSITIVITY   PROTIME-INR        ECG Results              EKG 12-lead (In process)        Collection Time Result Time QRS Duration OHS QTC Calculation    05/14/24 04:25:37 05/14/24 05:10:00 94 431                     In process by Interface, Lab In [facility] (05/14/24 05:10:07)                   Narrative:    Test Reason : R07.9,    Vent. Rate : 072 BPM     Atrial Rate : 072 BPM     P-R Int : 162 ms          QRS Dur : 094 ms      QT Int : 394 ms       P-R-T Axes : 033 068 016 degrees     QTc Int : 431 ms    Normal sinus rhythm  ST elevation consider anterior injury or acute infarct    ACUTE MI / STEMI     Abnormal ECG  When compared with ECG of 14-MAY-2024 04:24,  No significant change was found    Referred By:             Confirmed By:                                   Imaging Results              X-Ray Chest AP Portable (Final result)  Result time 05/14/24 04:58:15      Final result by Ama Rao MD (05/14/24 04:58:15)                   Impression:      Please see above.      Electronically signed by: Ama Rao MD  Date:    05/14/2024  Time:    04:58               Narrative:    EXAMINATION:  XR CHEST AP PORTABLE    CLINICAL HISTORY:  chest pain;    TECHNIQUE:  Single frontal view of the chest was performed.    COMPARISON:  None    FINDINGS:  Cardiac monitoring leads and cardiac pacing/defibrillator pads project over the chest.  There is mild prominence of the cardiomediastinal silhouette.  Mediastinal structures are midline.  Are symmetrically expanded without evidence of confluent airspace consolidation.  No substantial volume of pleural fluid or pneumothorax identified.  Osseous structures intact with degenerative change and postoperative change of the lower cervical spine.                                       Medications   0.45% NaCl infusion (0 mL/hr Intravenous Stopped 5/14/24 1600)   nitroGLYCERIN SL tablet 0.4 mg (has no administration in time range)   ondansetron injection 4 mg (has no administration in time range)   clopidogreL tablet 75 mg (has no administration in time range)   aspirin EC tablet 81 mg (has no administration in time range)   acetaminophen tablet 650 mg (650 mg Oral Given 5/14/24 2239)   mupirocin 2 % ointment (1 g Nasal Given 5/14/24 2025)   aluminum-magnesium hydroxide-simethicone 200-200-20 mg/5 mL suspension 30 mL (has no administration in time range)   atorvastatin tablet 40 mg (40 mg Oral Given 5/14/24 2025)   carvediloL tablet 25 mg (25 mg Oral Given 5/14/24 2025)   DULoxetine DR capsule 60 mg (60 mg Oral Given 5/14/24 2025)   pantoprazole EC tablet 40 mg (0 mg Oral Hold  5/14/24 1230)   pregabalin capsule 50 mg (50 mg Oral Given 5/14/24 2025)   valsartan tablet 160 mg (160 mg Oral Not Given 5/14/24 1230)   spironolactone tablet 25 mg (0 mg Oral Hold 5/14/24 1230)   ondansetron injection 4 mg (4 mg Intravenous Given 5/14/24 0438)   morphine injection 4 mg (4 mg Intravenous Given 5/14/24 0445)     Medical Decision Making  The patient had an EKG which shows what appears to be an acute MI.  She is deep T-wave inversion 2 3 and AVF with ST elevation V1 V2 V3.  The patient has continued pain after morphine here along with sublingual nitroglycerin.  The case was discussed an EKG was sent to Dr. Meghna crespo the cardiologist on-call.  He agrees with me and because she has continued chest pain will take to the cath lab.  Cath lab was called at 4:40 a.m..  Chest x-ray shows no gross abnormality.  Patient has no allergies to dye.    Amount and/or Complexity of Data Reviewed  Labs: ordered.  Radiology: ordered.    Risk  Prescription drug management.              Attending Attestation:         Attending Critical Care:   Critical Care Times:   Direct Patient Care (initial evaluation, reassessments, and time considering the case)................................................................15 minutes.   Ordering, Reviewing, and Interpreting Diagnostic Studies...............................................................................................................10 minutes.   Documentation..................................................................................................................................................................................5 minutes.   Consultation with other Physicians. .................................................................................................................................................5 minutes.   ==============================================================  Total Critical Care Time - exclusive of procedural time: 35  minutes.  ==============================================================  Critical care was necessary to treat or prevent imminent or life-threatening deterioration of the following conditions: myocardial infarction.   The following critical care procedures were done by me (see procedure notes): pulse oximetry.   Critical care was time spent personally by me on the following activities: examination of patient, ordering lab, x-rays, and/or EKG, evaluation of patient's response to treatment, discussion with consultants and re-evaluation of patient's conition.   Critical Care Condition: critical                                  Clinical Impression:  Final diagnoses:  [R07.9] Chest pain                 Varun Ruiz MD  05/15/24 0521

## 2024-05-14 NOTE — NURSING
Nurses Note -- 4 Eyes      5/14/2024   8:43 AM      Skin assessed during: Admit      [x] No Altered Skin Integrity Present    [x]Prevention Measures Documented      [] Yes- Altered Skin Integrity Present or Discovered   [] LDA Added if Not in Epic (Describe Wound)   [] New Altered Skin Integrity was Present on Admit and Documented in LDA   [] Wound Image Taken    Wound Care Consulted? No    Attending Nurse:  Laxmi Miller RN/Staff Member:   Spring NIÑO

## 2024-05-14 NOTE — H&P
[facility] Medicine  History & Physical    Patient Name: Sherry Anna  MRN: [id number]  Patient Class: IP- Inpatient  Admission Date: 5/14/2024  Attending Physician: Daniel Dotson MD   Primary Care Provider: Jesse Leggett MD         Patient information was obtained from patient, relative(s), past medical records, and ER records.     Subjective:     Principal Problem:STEMI (ST elevation myocardial infarction)    Chief Complaint:   Chief Complaint   Patient presents with    Chest Pain    Nausea     Pt presents to ED c/o chest pain, n/v since 0200 that woke her up from her sleep. Given 4mg IV morphine, 1SL Nitro, 4mg IV Zofran via EMS pta. Pt took (4) 81mg ASA         HPI: 59F with PMH HTN, HLD, GERD, anxiety/depression, and osteoarthritis presented for chest pain that woke her from sleep found to have STEMI. She was taken emergently to cath lab and had a stent placed to her LAD where there was a 99% blockage. There were other stenoses reported as well to be managed medically. She had great resolution of symptoms after stent was placed. She is seen post-cath in ICU reporting feeling well with family bedside. Admitted to hospital medicine for continued management.      No past surgical history on file.    Review of patient's allergies indicates:   Allergen Reactions    Codeine Itching       No current facility-administered medications on file prior to encounter.     Current Outpatient Medications on File Prior to Encounter   Medication Sig    atorvastatin (LIPITOR) 20 MG tablet Take 20 mg by mouth once daily.    carvediloL (COREG) 25 MG tablet Take 25 mg by mouth 2 (two) times daily.    celecoxib (CELEBREX) 200 MG capsule Take 200 mg by mouth 2 (two) times daily.    cyclobenzaprine (FLEXERIL) 10 MG tablet Take 10 mg by mouth every 8 (eight) hours as needed.    DULoxetine (CYMBALTA) 60 MG capsule Take 60 mg by mouth 2 (two) times daily.    omeprazole (PRILOSEC) 40 MG capsule Take 40 mg by  mouth every morning.    oxyCODONE-acetaminophen (PERCOCET)  mg per tablet Take 1 tablet by mouth every 8 (eight) hours.    phentermine (ADIPEX-P) 37.5 mg tablet Take 37.5 mg by mouth once daily.    pregabalin (LYRICA) 50 MG capsule Take 50 mg by mouth 2 (two) times daily.    spironolactone (ALDACTONE) 25 MG tablet Take 25 mg by mouth once daily.    sumatriptan (IMITREX) 100 MG tablet Take 50 mg by mouth every 2 (two) hours as needed for Migraine.    valsartan (DIOVAN) 160 MG tablet Take 160 mg by mouth once daily.     Family History    None       Tobacco Use    Smoking status: Not on file    Smokeless tobacco: Not on file   Substance and Sexual Activity    Alcohol use: Not on file    Drug use: Not on file    Sexual activity: Not on file     Review of Systems   Constitutional:  Negative for chills and fever.   Respiratory:  Negative for shortness of breath.    Cardiovascular:  Negative for chest pain.   Gastrointestinal:  Negative for abdominal pain, constipation, diarrhea, nausea and vomiting.     Objective:     Vital Signs (Most Recent):  Temp: 97.4 °F (36.3 °C) (05/14/24 1105)  Pulse: 76 (05/14/24 1200)  Resp: 13 (05/14/24 1200)  BP: 95/63 (05/14/24 1200)  SpO2: 97 % (05/14/24 1200) Vital Signs (24h Range):  Temp:  [97.4 °F (36.3 °C)-97.8 °F (36.6 °C)] 97.4 °F (36.3 °C)  Pulse:  [74-81] 76  Resp:  [13-36] 13  SpO2:  [88 %-100 %] 97 %  BP: ()/(61-94) 95/63     Weight: 93.6 kg (206 lb 5.6 oz)  Body mass index is 36.55 kg/m².     Physical Exam  Vitals reviewed.   Constitutional:       General: She is not in acute distress.  HENT:      Head: Normocephalic and atraumatic.   Cardiovascular:      Rate and Rhythm: Normal rate and regular rhythm.      Heart sounds: Normal heart sounds.   Pulmonary:      Effort: Pulmonary effort is normal. No respiratory distress.      Breath sounds: Normal breath sounds. No wheezing, rhonchi or rales.   Neurological:      General: No focal deficit present.      Mental Status:  She is alert and oriented to person, place, and time. Mental status is at baseline.   Psychiatric:         Mood and Affect: Affect normal.         Behavior: Behavior normal.         Thought Content: Thought content normal.                Significant Labs: All pertinent labs within the past 24 hours have been reviewed.    Significant Imaging: I have reviewed all pertinent imaging results/findings within the past 24 hours.    Admission on 05/14/2024   Component Date Value Ref Range Status    QRS Duration 05/14/2024 94  ms In process    [facility]HS QTC Calculation 05/14/2024 431  ms In process    WBC 05/14/2024 10.03  3.90 - 12.70 K/uL Final    RBC 05/14/2024 4.64  4.00 - 5.40 M/uL Final    Hemoglobin 05/14/2024 13.6  12.0 - 16.0 g/dL Final    Hematocrit 05/14/2024 43.6  37.0 - 48.5 % Final    MCV 05/14/2024 94  82 - 98 fL Final    MCH 05/14/2024 29.3  27.0 - 31.0 pg Final    MCHC 05/14/2024 31.2 (L)  32.0 - 36.0 g/dL Final    RDW 05/14/2024 13.7  11.5 - 14.5 % Final    Platelets 05/14/2024 247  150 - 450 K/uL Final    MPV 05/14/2024 10.2  9.2 - 12.9 fL Final    Immature Granulocytes 05/14/2024 0.3  0.0 - 0.5 % Final    Gran # (ANC) 05/14/2024 5.1  1.8 - 7.7 K/uL Final    Immature Grans (Abs) 05/14/2024 0.03  0.00 - 0.04 K/uL Final    Comment: Mild elevation in immature granulocytes is non specific and   can be seen in a variety of conditions including stress response,   acute inflammation, trauma and pregnancy. Correlation with other   laboratory and clinical findings is essential.      Lymph # 05/14/2024 3.3  1.0 - 4.8 K/uL Final    Mono # 05/14/2024 1.2 (H)  0.3 - 1.0 K/uL Final    Eos # 05/14/2024 0.3  0.0 - 0.5 K/uL Final    Baso # 05/14/2024 0.04  0.00 - 0.20 K/uL Final    nRBC 05/14/2024 0  0 /100 WBC Final    Gran % 05/14/2024 51.1  38.0 - 73.0 % Final    Lymph % 05/14/2024 33.1  18.0 - 48.0 % Final    Mono % 05/14/2024 12.0  4.0 - 15.0 % Final    Eosinophil % 05/14/2024 3.1  0.0 - 8.0 % Final    Basophil % 05/14/2024  0.4  0.0 - 1.9 % Final    Differential Method 05/14/2024 Automated   Final    Sodium 05/14/2024 139  136 - 145 mmol/L Final    Potassium 05/14/2024 4.0  3.5 - 5.1 mmol/L Final    Chloride 05/14/2024 106  95 - 110 mmol/L Final    CO2 05/14/2024 30 (H)  23 - 29 mmol/L Final    Glucose 05/14/2024 115 (H)  70 - 110 mg/dL Final    BUN 05/14/2024 23  8 - 23 mg/dL Final    Creatinine 05/14/2024 0.9  0.5 - 1.4 mg/dL Final    Calcium 05/14/2024 9.0  8.7 - 10.5 mg/dL Final    Total Protein 05/14/2024 6.7  6.0 - 8.4 g/dL Final    Albumin 05/14/2024 4.1  3.5 - 5.2 g/dL Final    Total Bilirubin 05/14/2024 0.3  0.1 - 1.0 mg/dL Final    Comment: For infants and newborns, interpretation of results should be based  on gestational age, weight and in agreement with clinical  observations.    Premature Infant recommended reference ranges:  Up to 24 hours.............<8.0 mg/dL  Up to 48 hours............<12.0 mg/dL  3-5 days..................<15.0 mg/dL  6-29 days.................<15.0 mg/dL      Alkaline Phosphatase 05/14/2024 88  55 - 135 U/L Final    AST 05/14/2024 25  10 - 40 U/L Final    ALT 05/14/2024 28  10 - 44 U/L Final    eGFR 05/14/2024 >60.0  >60 mL/min/1.73 m^2 Final    Anion Gap 05/14/2024 3 (L)  8 - 16 mmol/L Final    BNP 05/14/2024 14  0 - 99 pg/mL Final    Values of less than 100 pg/ml are consistent with non-CHF populations.    Magnesium 05/14/2024 2.1  1.6 - 2.6 mg/dL Final    Troponin I High Sensitivity 05/14/2024 11.4  0.0 - 14.9 pg/mL Final    Comment: Troponin results differ between methods. Do not use   results between Troponin methods interchangeably.    Alkaline Phospatase levels above 400 U/L may   cause false positive results.    Access hsTnI should not be used for patients taking   Asfotase rani (Strensiq).      Troponin I High Sensitivity 05/14/2024 7108.2 (HH)  0.0 - 14.9 pg/mL Final    Comment: Troponin results differ between methods. Do not use   results between Troponin methods  interchangeably.    Alkaline Phospatase levels above 400 U/L may   cause false positive results.    Access hsTnI should not be used for patients taking   Asfotase rani (Strensiq).  Troponin critical result(s) called and verbal readback obtained from   Eliu Nunez RN @ 0906 HS by HS3 05/14/2024 12:14      Prothrombin Time 05/14/2024 9.8  9.0 - 12.5 sec Final    INR 05/14/2024 0.9  0.8 - 1.2 Final    Comment: Coumadin Therapy:  2.0 - 3.0 for INR for all indicators except mechanical heart valves  and antiphospholipid syndromes which should use 2.5 - 3.5.      POC ACTIVATED CLOTTING TIME K 05/14/2024 239 (H)  74 - 137 sec Final    Sample 05/14/2024 ARTERIAL   Final    Hemoglobin A1C 05/14/2024 6.0  4.5 - 6.2 % Final    Comment: According to ADA guidelines, hemoglobin A1C <7.0% represents  optimal control in non-pregnant diabetic patients.  Different  metrics may apply to specific populations.   Standards of Medical Care in Diabetes - 2016.    For the purpose of screening for the presence of diabetes:  <5.7%     Consistent with the absence of diabetes  5.7-6.4%  Consistent with increasing risk for diabetes   (prediabetes)  >or=6.5%  Consistent with diabetes    Currently no consensus exists for use of hemoglobin A1C  for diagnosis of diabetes for children.      Estimated Avg Glucose 05/14/2024 126  68 - 131 mg/dL Final    Cholesterol 05/14/2024 183  120 - 199 mg/dL Final    Comment: The National Cholesterol Education Program (NCEP) has set the  following guidelines (reference ranges) for Cholesterol:  Optimal.....................<200 mg/dL  Borderline High.............200-239 mg/dL  High........................> or = 240 mg/dL      Triglycerides 05/14/2024 46  30 - 150 mg/dL Final    Comment: The National Cholesterol Education Program (NCEP) has set the  following guidelines (reference values) for triglycerides:  Normal......................<150 mg/dL  Borderline High.............150-199  mg/dL  High........................200-499 mg/dL      HDL 05/14/2024 49  40 - 75 mg/dL Final    Comment: The National Cholesterol Education Program (NCEP) has set the  following guidelines (reference values) for HDL Cholesterol:  Low...............<40 mg/dL  Optimal...........>60 mg/dL      LDL Cholesterol 05/14/2024 124.8  63.0 - 159.0 mg/dL Final    Comment: The National Cholesterol Education Program (NCEP) has set the  following guidelines (reference values) for LDL Cholesterol:  Optimal.......................<130 mg/dL  Borderline High...............130-159 mg/dL  High..........................160-189 mg/dL  Very High.....................>190 mg/dL      HDL/Cholesterol Ratio 05/14/2024 26.8  20.0 - 50.0 % Final    Total Cholesterol/HDL Ratio 05/14/2024 3.7  2.0 - 5.0 Final    Non-HDL Cholesterol 05/14/2024 134  mg/dL Final    Comment: Risk category and Non-HDL cholesterol goals:  Coronary heart disease (CHD)or equivalent (10-year risk of CHD >20%):  Non-HDL cholesterol goal     <130 mg/dL  Two or more CHD risk factors and 10-year risk of CHD <= 20%:  Non-HDL cholesterol goal     <160 mg/dL  0 to 1 CHD risk factor:  Non-HDL cholesterol goal     <190 mg/dL      BSA 05/14/2024 2.04  m2 In process    Troponin I High Sensitivity 05/14/2024 7108.2 (HH)  0.0 - 14.9 pg/mL Final    Comment: Troponin results differ between methods. Do not use   results between Troponin methods interchangeably.    Alkaline Phospatase levels above 400 U/L may   cause false positive results.    Access hsTnI should not be used for patients taking   Asfotase rani (Strensiq).  Critical result [facility] 7108.2 pg/mL called to and read back by Eliu Nunez RN at 14-May-2024 09:11 by Deidre.  Result Rechecked           Cardiac catheterization    Result Date: 5/14/2024    The Ost LAD lesion was 50% stenosed.   The Mid LAD lesion was 99% stenosed with 0% stenosis post-intervention.   Mid LAD lesion: A .   Mid LAD lesion: A STENT FRONTIER  DIVINE [id number] stent was successfully placed at 12 [place] for 30 sec.   The estimated blood loss was <50 mL.   There was single vessel coronary artery disease. The procedure log was documented by Documenter: Benita Bates RN and verified by Isai Coffman MD. Date: 5/14/2024  Time: 7:14 AM     X-Ray Chest AP Portable    Result Date: 5/14/2024  EXAMINATION: XR CHEST AP PORTABLE CLINICAL HISTORY: chest pain; TECHNIQUE: Single frontal view of the chest was performed. COMPARISON: None FINDINGS: Cardiac monitoring leads and cardiac pacing/defibrillator pads project over the chest.  There is mild prominence of the cardiomediastinal silhouette.  Mediastinal structures are midline.  Are symmetrically expanded without evidence of confluent airspace consolidation.  No substantial volume of pleural fluid or pneumothorax identified.  Osseous structures intact with degenerative change and postoperative change of the lower cervical spine.     Please see above. Electronically signed by: Ama Rao MD Date:    05/14/2024 Time:    04:58    Assessment/Plan:     * STEMI (ST elevation myocardial infarction)  S/p stent to LAD  - post-cath care  - tele  - start DAPT and increased statin  - continue coreg and valsartan  - cardiology following    Osteoarthritis  Stable  - continue cymbalta and lyrica  - hold celebrex now on DAPT    Anxiety and depression  Stable  - continue cymbalta    HLD (hyperlipidemia)  Chronic  - continue statin increased    HTN (hypertension)  Chronic, controlled  - continue home valsartan, aldactone, coreg    Cigarette nicotine dependence without complication  - encourage cessation  - prn nicotine patch      VTE Risk Mitigation (From admission, onward)           Ordered     IP VTE HIGH RISK PATIENT  Once         05/14/24 1335     Place sequential compression device  Until discontinued         05/14/24 1335                               Daniel Dotson MD  Department of Hospital Medicine  [facility]  [facility]

## 2024-05-14 NOTE — HPI
59F with PMH HTN, HLD, GERD, anxiety/depression, and osteoarthritis presented for chest pain that woke her from sleep found to have STEMI. She was taken emergently to cath lab and had a stent placed to her LAD where there was a 99% blockage. There were other stenoses reported as well to be managed medically. She had great resolution of symptoms after stent was placed. She is seen post-cath in ICU reporting feeling well with family bedside. Admitted to hospital medicine for continued management.

## 2024-05-14 NOTE — SUBJECTIVE & OBJECTIVE
No past medical history on file.    No past surgical history on file.    Review of patient's allergies indicates:   Allergen Reactions    Codeine Itching       No current facility-administered medications on file prior to encounter.     Current Outpatient Medications on File Prior to Encounter   Medication Sig    atorvastatin (LIPITOR) 20 MG tablet Take 20 mg by mouth once daily.    carvediloL (COREG) 25 MG tablet Take 25 mg by mouth 2 (two) times daily.    celecoxib (CELEBREX) 200 MG capsule Take 200 mg by mouth 2 (two) times daily.    cyclobenzaprine (FLEXERIL) 10 MG tablet Take 10 mg by mouth every 8 (eight) hours as needed.    DULoxetine (CYMBALTA) 60 MG capsule Take 60 mg by mouth 2 (two) times daily.    omeprazole (PRILOSEC) 40 MG capsule Take 40 mg by mouth every morning.    oxyCODONE-acetaminophen (PERCOCET)  mg per tablet Take 1 tablet by mouth every 8 (eight) hours.    phentermine (ADIPEX-P) 37.5 mg tablet Take 37.5 mg by mouth once daily.    pregabalin (LYRICA) 50 MG capsule Take 50 mg by mouth 2 (two) times daily.    spironolactone (ALDACTONE) 25 MG tablet Take 25 mg by mouth once daily.    sumatriptan (IMITREX) 100 MG tablet Take 50 mg by mouth every 2 (two) hours as needed for Migraine.    valsartan (DIOVAN) 160 MG tablet Take 160 mg by mouth once daily.     Family History    None       Tobacco Use    Smoking status: Not on file    Smokeless tobacco: Not on file   Substance and Sexual Activity    Alcohol use: Not on file    Drug use: Not on file    Sexual activity: Not on file     Review of Systems   Constitutional:  Negative for chills and fever.   Respiratory:  Negative for shortness of breath.    Cardiovascular:  Negative for chest pain.   Gastrointestinal:  Negative for abdominal pain, constipation, diarrhea, nausea and vomiting.     Objective:     Vital Signs (Most Recent):  Temp: 97.4 °F (36.3 °C) (05/14/24 1105)  Pulse: 76 (05/14/24 1200)  Resp: 13 (05/14/24 1200)  BP: 95/63 (05/14/24  1200)  SpO2: 97 % (05/14/24 1200) Vital Signs (24h Range):  Temp:  [97.4 °F (36.3 °C)-97.8 °F (36.6 °C)] 97.4 °F (36.3 °C)  Pulse:  [74-81] 76  Resp:  [13-36] 13  SpO2:  [88 %-100 %] 97 %  BP: ()/(61-94) 95/63     Weight: 93.6 kg (206 lb 5.6 oz)  Body mass index is 36.55 kg/m².     Physical Exam  Vitals reviewed.   Constitutional:       General: She is not in acute distress.  HENT:      Head: Normocephalic and atraumatic.   Cardiovascular:      Rate and Rhythm: Normal rate and regular rhythm.      Heart sounds: Normal heart sounds.   Pulmonary:      Effort: Pulmonary effort is normal. No respiratory distress.      Breath sounds: Normal breath sounds. No wheezing, rhonchi or rales.   Neurological:      General: No focal deficit present.      Mental Status: She is alert and oriented to person, place, and time. Mental status is at baseline.   Psychiatric:         Mood and Affect: Affect normal.         Behavior: Behavior normal.         Thought Content: Thought content normal.                Significant Labs: All pertinent labs within the past 24 hours have been reviewed.    Significant Imaging: I have reviewed all pertinent imaging results/findings within the past 24 hours.    Admission on 05/14/2024   Component Date Value Ref Range Status    QRS Duration 05/14/2024 94  ms In process    OHS QTC Calculation 05/14/2024 431  ms In process    WBC 05/14/2024 10.03  3.90 - 12.70 K/uL Final    RBC 05/14/2024 4.64  4.00 - 5.40 M/uL Final    Hemoglobin 05/14/2024 13.6  12.0 - 16.0 g/dL Final    Hematocrit 05/14/2024 43.6  37.0 - 48.5 % Final    MCV 05/14/2024 94  82 - 98 fL Final    MCH 05/14/2024 29.3  27.0 - 31.0 pg Final    MCHC 05/14/2024 31.2 (L)  32.0 - 36.0 g/dL Final    RDW 05/14/2024 13.7  11.5 - 14.5 % Final    Platelets 05/14/2024 247  150 - 450 K/uL Final    MPV 05/14/2024 10.2  9.2 - 12.9 fL Final    Immature Granulocytes 05/14/2024 0.3  0.0 - 0.5 % Final    Gran # (ANC) 05/14/2024 5.1  1.8 - 7.7 K/uL Final     Immature Grans (Abs) 05/14/2024 0.03  0.00 - 0.04 K/uL Final    Comment: Mild elevation in immature granulocytes is non specific and   can be seen in a variety of conditions including stress response,   acute inflammation, trauma and pregnancy. Correlation with other   laboratory and clinical findings is essential.      Lymph # 05/14/2024 3.3  1.0 - 4.8 K/uL Final    Mono # 05/14/2024 1.2 (H)  0.3 - 1.0 K/uL Final    Eos # 05/14/2024 0.3  0.0 - 0.5 K/uL Final    Baso # 05/14/2024 0.04  0.00 - 0.20 K/uL Final    nRBC 05/14/2024 0  0 /100 WBC Final    Gran % 05/14/2024 51.1  38.0 - 73.0 % Final    Lymph % 05/14/2024 33.1  18.0 - 48.0 % Final    Mono % 05/14/2024 12.0  4.0 - 15.0 % Final    Eosinophil % 05/14/2024 3.1  0.0 - 8.0 % Final    Basophil % 05/14/2024 0.4  0.0 - 1.9 % Final    Differential Method 05/14/2024 Automated   Final    Sodium 05/14/2024 139  136 - 145 mmol/L Final    Potassium 05/14/2024 4.0  3.5 - 5.1 mmol/L Final    Chloride 05/14/2024 106  95 - 110 mmol/L Final    CO2 05/14/2024 30 (H)  23 - 29 mmol/L Final    Glucose 05/14/2024 115 (H)  70 - 110 mg/dL Final    BUN 05/14/2024 23  8 - 23 mg/dL Final    Creatinine 05/14/2024 0.9  0.5 - 1.4 mg/dL Final    Calcium 05/14/2024 9.0  8.7 - 10.5 mg/dL Final    Total Protein 05/14/2024 6.7  6.0 - 8.4 g/dL Final    Albumin 05/14/2024 4.1  3.5 - 5.2 g/dL Final    Total Bilirubin 05/14/2024 0.3  0.1 - 1.0 mg/dL Final    Comment: For infants and newborns, interpretation of results should be based  on gestational age, weight and in agreement with clinical  observations.    Premature Infant recommended reference ranges:  Up to 24 hours.............<8.0 mg/dL  Up to 48 hours............<12.0 mg/dL  3-5 days..................<15.0 mg/dL  6-29 days.................<15.0 mg/dL      Alkaline Phosphatase 05/14/2024 88  55 - 135 U/L Final    AST 05/14/2024 25  10 - 40 U/L Final    ALT 05/14/2024 28  10 - 44 U/L Final    eGFR 05/14/2024 >60.0  >60 mL/min/1.73 m^2  Final    Anion Gap 05/14/2024 3 (L)  8 - 16 mmol/L Final    BNP 05/14/2024 14  0 - 99 pg/mL Final    Values of less than 100 pg/ml are consistent with non-CHF populations.    Magnesium 05/14/2024 2.1  1.6 - 2.6 mg/dL Final    Troponin I High Sensitivity 05/14/2024 11.4  0.0 - 14.9 pg/mL Final    Comment: Troponin results differ between methods. Do not use   results between Troponin methods interchangeably.    Alkaline Phospatase levels above 400 U/L may   cause false positive results.    Access hsTnI should not be used for patients taking   Asfotase rani (Strensiq).      Troponin I High Sensitivity 05/14/2024 7108.2 (HH)  0.0 - 14.9 pg/mL Final    Comment: Troponin results differ between methods. Do not use   results between Troponin methods interchangeably.    Alkaline Phospatase levels above 400 U/L may   cause false positive results.    Access hsTnI should not be used for patients taking   Asfotase rani (Strensiq).  Troponin critical result(s) called and verbal readback obtained from   Eliu Nunez, RN @ 0931 HS by HS3 05/14/2024 12:14      Prothrombin Time 05/14/2024 9.8  9.0 - 12.5 sec Final    INR 05/14/2024 0.9  0.8 - 1.2 Final    Comment: Coumadin Therapy:  2.0 - 3.0 for INR for all indicators except mechanical heart valves  and antiphospholipid syndromes which should use 2.5 - 3.5.      POC ACTIVATED CLOTTING TIME K 05/14/2024 239 (H)  74 - 137 sec Final    Sample 05/14/2024 ARTERIAL   Final    Hemoglobin A1C 05/14/2024 6.0  4.5 - 6.2 % Final    Comment: According to ADA guidelines, hemoglobin A1C <7.0% represents  optimal control in non-pregnant diabetic patients.  Different  metrics may apply to specific populations.   Standards of Medical Care in Diabetes - 2016.    For the purpose of screening for the presence of diabetes:  <5.7%     Consistent with the absence of diabetes  5.7-6.4%  Consistent with increasing risk for diabetes   (prediabetes)  >or=6.5%  Consistent with diabetes    Currently no  consensus exists for use of hemoglobin A1C  for diagnosis of diabetes for children.      Estimated Avg Glucose 05/14/2024 126  68 - 131 mg/dL Final    Cholesterol 05/14/2024 183  120 - 199 mg/dL Final    Comment: The National Cholesterol Education Program (NCEP) has set the  following guidelines (reference ranges) for Cholesterol:  Optimal.....................<200 mg/dL  Borderline High.............200-239 mg/dL  High........................> or = 240 mg/dL      Triglycerides 05/14/2024 46  30 - 150 mg/dL Final    Comment: The National Cholesterol Education Program (NCEP) has set the  following guidelines (reference values) for triglycerides:  Normal......................<150 mg/dL  Borderline High.............150-199 mg/dL  High........................200-499 mg/dL      HDL 05/14/2024 49  40 - 75 mg/dL Final    Comment: The National Cholesterol Education Program (NCEP) has set the  following guidelines (reference values) for HDL Cholesterol:  Low...............<40 mg/dL  Optimal...........>60 mg/dL      LDL Cholesterol 05/14/2024 124.8  63.0 - 159.0 mg/dL Final    Comment: The National Cholesterol Education Program (NCEP) has set the  following guidelines (reference values) for LDL Cholesterol:  Optimal.......................<130 mg/dL  Borderline High...............130-159 mg/dL  High..........................160-189 mg/dL  Very High.....................>190 mg/dL      HDL/Cholesterol Ratio 05/14/2024 26.8  20.0 - 50.0 % Final    Total Cholesterol/HDL Ratio 05/14/2024 3.7  2.0 - 5.0 Final    Non-HDL Cholesterol 05/14/2024 134  mg/dL Final    Comment: Risk category and Non-HDL cholesterol goals:  Coronary heart disease (CHD)or equivalent (10-year risk of CHD >20%):  Non-HDL cholesterol goal     <130 mg/dL  Two or more CHD risk factors and 10-year risk of CHD <= 20%:  Non-HDL cholesterol goal     <160 mg/dL  0 to 1 CHD risk factor:  Non-HDL cholesterol goal     <190 mg/dL      BSA 05/14/2024 2.04  m2 In process     Troponin I High Sensitivity 05/14/2024 7108.2 (HH)  0.0 - 14.9 pg/mL Final    Comment: Troponin results differ between methods. Do not use   results between Troponin methods interchangeably.    Alkaline Phospatase levels above 400 U/L may   cause false positive results.    Access hsTnI should not be used for patients taking   Asfotase rani (Strensiq).  Critical result TNIHS 7108.2 pg/mL called to and read back by Eliu Nunez RN at 14-May-2024 09:11 [facility].  Result Rechecked           Cardiac catheterization    Result Date: 5/14/2024    The Ost LAD lesion was 50% stenosed.   The Mid LAD lesion was 99% stenosed with 0% stenosis post-intervention.   Mid LAD lesion: A .   Mid LAD lesion: A STENT FRONTIER DIVINE [id number] stent was successfully placed at 12 LANDON for 30 sec.   The estimated blood loss was <50 mL.   There was single vessel coronary artery disease. The procedure log was documented by Documenter: Benita Bates RN and verified by Isai Coffman MD. Date: 5/14/2024  Time: 7:14 AM     X-Ray Chest AP Portable    Result Date: 5/14/2024  EXAMINATION: XR CHEST AP PORTABLE CLINICAL HISTORY: chest pain; TECHNIQUE: Single frontal view of the chest was performed. COMPARISON: None FINDINGS: Cardiac monitoring leads and cardiac pacing/defibrillator pads project over the chest.  There is mild prominence of the cardiomediastinal silhouette.  Mediastinal structures are midline.  Are symmetrically expanded without evidence of confluent airspace consolidation.  No substantial volume of pleural fluid or pneumothorax identified.  Osseous structures intact with degenerative change and postoperative change of the lower cervical spine.     Please see above. Electronically signed by: Ama Rao MD Date:    05/14/2024 Time:    04:58

## 2024-05-14 NOTE — Clinical Note
The catheter was repositioned into the ostium   right coronary artery. An angiography was performed of the right coronary arteries. Multiple views were taken. The angiography was performed via hand injection with 10 mL of contrast.  Contrast estimated

## 2024-05-15 ENCOUNTER — TELEPHONE (OUTPATIENT)
Dept: CARDIOLOGY | Facility: CLINIC | Age: 59
End: 2024-05-15
Payer: COMMERCIAL

## 2024-05-15 VITALS
TEMPERATURE: 98 F | OXYGEN SATURATION: 96 % | DIASTOLIC BLOOD PRESSURE: 72 MMHG | WEIGHT: 206.38 LBS | HEIGHT: 63 IN | BODY MASS INDEX: 36.57 KG/M2 | HEART RATE: 81 BPM | SYSTOLIC BLOOD PRESSURE: 114 MMHG | RESPIRATION RATE: 19 BRPM

## 2024-05-15 LAB
ANION GAP SERPL CALC-SCNC: 5 MMOL/L (ref 8–16)
AORTIC ROOT ANNULUS: 2.5 CM
AORTIC VALVE CUSP SEPERATION: 1.5 CM
AV INDEX (PROSTH): 0.67
AV MEAN GRADIENT: 3 MMHG
AV PEAK GRADIENT: 5 MMHG
AV VELOCITY RATIO: 0.69
BASOPHILS # BLD AUTO: 0.03 K/UL (ref 0–0.2)
BASOPHILS NFR BLD: 0.4 % (ref 0–1.9)
BSA FOR ECHO PROCEDURE: 2.04 M2
BUN SERPL-MCNC: 17 MG/DL (ref 8–23)
CALCIUM SERPL-MCNC: 8.5 MG/DL (ref 8.7–10.5)
CHLORIDE SERPL-SCNC: 107 MMOL/L (ref 95–110)
CO2 SERPL-SCNC: 26 MMOL/L (ref 23–29)
CREAT SERPL-MCNC: 0.8 MG/DL (ref 0.5–1.4)
CV ECHO LV RWT: 0.41 CM
DIFFERENTIAL METHOD BLD: ABNORMAL
DOP CALC AO PEAK VEL: 1.12 M/S
DOP CALC AO VTI: 24.1 CM
DOP CALC LVOT PEAK VEL: 0.77 M/S
DOP CALC MV VTI: 29.9 CM
DOP CALCLVOT PEAK VEL VTI: 16.2 CM
E WAVE DECELERATION TIME: 169 MSEC
E/A RATIO: 0.8
E/E' RATIO: 10.14 M/S
ECHO LV POSTERIOR WALL: 0.98 CM (ref 0.6–1.1)
EJECTION FRACTION: 50 %
EOSINOPHIL # BLD AUTO: 0.2 K/UL (ref 0–0.5)
EOSINOPHIL NFR BLD: 1.9 % (ref 0–8)
ERYTHROCYTE [DISTWIDTH] IN BLOOD BY AUTOMATED COUNT: 13.8 % (ref 11.5–14.5)
EST. GFR  (NO RACE VARIABLE): >60 ML/MIN/1.73 M^2
FRACTIONAL SHORTENING: 31 % (ref 28–44)
GLUCOSE SERPL-MCNC: 122 MG/DL (ref 70–110)
HCT VFR BLD AUTO: 40 % (ref 37–48.5)
HGB BLD-MCNC: 12.5 G/DL (ref 12–16)
IMM GRANULOCYTES # BLD AUTO: 0.02 K/UL (ref 0–0.04)
IMM GRANULOCYTES NFR BLD AUTO: 0.2 % (ref 0–0.5)
INTERVENTRICULAR SEPTUM: 0.97 CM (ref 0.6–1.1)
IVC DIAMETER: 1.37 CM
LEFT INTERNAL DIMENSION IN SYSTOLE: 3.28 CM (ref 2.1–4)
LEFT VENTRICLE DIASTOLIC VOLUME INDEX: 53.57 ML/M2
LEFT VENTRICLE DIASTOLIC VOLUME: 105 ML
LEFT VENTRICLE MASS INDEX: 83 G/M2
LEFT VENTRICLE SYSTOLIC VOLUME INDEX: 22.2 ML/M2
LEFT VENTRICLE SYSTOLIC VOLUME: 43.5 ML
LEFT VENTRICULAR INTERNAL DIMENSION IN DIASTOLE: 4.76 CM (ref 3.5–6)
LEFT VENTRICULAR MASS: 162.23 G
LV LATERAL E/E' RATIO: 8.88 M/S
LV SEPTAL E/E' RATIO: 11.83 M/S
LVOT MG: 1 MMHG
LVOT MV: 0.5 CM/S
LYMPHOCYTES # BLD AUTO: 2.6 K/UL (ref 1–4.8)
LYMPHOCYTES NFR BLD: 30.9 % (ref 18–48)
MCH RBC QN AUTO: 29.2 PG (ref 27–31)
MCHC RBC AUTO-ENTMCNC: 31.3 G/DL (ref 32–36)
MCV RBC AUTO: 94 FL (ref 82–98)
MONOCYTES # BLD AUTO: 1 K/UL (ref 0.3–1)
MONOCYTES NFR BLD: 11.5 % (ref 4–15)
MV MEAN GRADIENT: 2 MMHG
MV PEAK A VEL: 0.89 M/S
MV PEAK E VEL: 0.71 M/S
MV PEAK GRADIENT: 6 MMHG
NEUTROPHILS # BLD AUTO: 4.6 K/UL (ref 1.8–7.7)
NEUTROPHILS NFR BLD: 55.1 % (ref 38–73)
NRBC BLD-RTO: 0 /100 WBC
OHS LV EJECTION FRACTION SIMPSONS BIPLANE MOD: 54 %
PISA MRMAX VEL: 3.02 M/S
PLATELET # BLD AUTO: 214 K/UL (ref 150–450)
PMV BLD AUTO: 10.5 FL (ref 9.2–12.9)
POTASSIUM SERPL-SCNC: 4.2 MMOL/L (ref 3.5–5.1)
PV MV: 0.61 M/S
PV PEAK GRADIENT: 3 MMHG
PV PEAK VELOCITY: 0.87 M/S
RA PRESSURE ESTIMATED: 3 MMHG
RBC # BLD AUTO: 4.28 M/UL (ref 4–5.4)
RV TISSUE DOPPLER FREE WALL SYSTOLIC VELOCITY 1 (APICAL 4 CHAMBER VIEW): 9.46 CM/S
SODIUM SERPL-SCNC: 138 MMOL/L (ref 136–145)
TDI LATERAL: 0.08 M/S
TDI SEPTAL: 0.06 M/S
TDI: 0.07 M/S
TRICUSPID ANNULAR PLANE SYSTOLIC EXCURSION: 1.74 CM
TROPONIN I SERPL HS-MCNC: ABNORMAL PG/ML (ref 0–14.9)
WBC # BLD AUTO: 8.38 K/UL (ref 3.9–12.7)
Z-SCORE OF LEFT VENTRICULAR DIMENSION IN END DIASTOLE: -1.64
Z-SCORE OF LEFT VENTRICULAR DIMENSION IN END SYSTOLE: -0.4

## 2024-05-15 PROCEDURE — 36415 COLL VENOUS BLD VENIPUNCTURE: CPT | Performed by: STUDENT IN AN ORGANIZED HEALTH CARE EDUCATION/TRAINING PROGRAM

## 2024-05-15 PROCEDURE — 85025 COMPLETE CBC W/AUTO DIFF WBC: CPT | Performed by: STUDENT IN AN ORGANIZED HEALTH CARE EDUCATION/TRAINING PROGRAM

## 2024-05-15 PROCEDURE — 94761 N-INVAS EAR/PLS OXIMETRY MLT: CPT

## 2024-05-15 PROCEDURE — 25000003 PHARM REV CODE 250: Performed by: STUDENT IN AN ORGANIZED HEALTH CARE EDUCATION/TRAINING PROGRAM

## 2024-05-15 PROCEDURE — 80048 BASIC METABOLIC PNL TOTAL CA: CPT | Performed by: STUDENT IN AN ORGANIZED HEALTH CARE EDUCATION/TRAINING PROGRAM

## 2024-05-15 PROCEDURE — 84484 ASSAY OF TROPONIN QUANT: CPT | Performed by: STUDENT IN AN ORGANIZED HEALTH CARE EDUCATION/TRAINING PROGRAM

## 2024-05-15 RX ORDER — ASPIRIN 81 MG/1
81 TABLET ORAL DAILY
Qty: 90 TABLET | Refills: 3 | Status: SHIPPED | OUTPATIENT
Start: 2024-05-16

## 2024-05-15 RX ORDER — CLOPIDOGREL BISULFATE 75 MG/1
75 TABLET ORAL DAILY
Qty: 90 TABLET | Refills: 3 | Status: SHIPPED | OUTPATIENT
Start: 2024-05-16

## 2024-05-15 RX ORDER — IBUPROFEN 200 MG
1 TABLET ORAL DAILY
Qty: 30 PATCH | Refills: 2 | Status: SHIPPED | OUTPATIENT
Start: 2024-05-15

## 2024-05-15 RX ADMIN — DULOXETINE HYDROCHLORIDE 60 MG: 30 CAPSULE, DELAYED RELEASE ORAL at 08:05

## 2024-05-15 RX ADMIN — CARVEDILOL 25 MG: 12.5 TABLET, FILM COATED ORAL at 08:05

## 2024-05-15 RX ADMIN — CLOPIDOGREL BISULFATE 75 MG: 75 TABLET, FILM COATED ORAL at 08:05

## 2024-05-15 RX ADMIN — SPIRONOLACTONE 25 MG: 25 TABLET ORAL at 08:05

## 2024-05-15 RX ADMIN — PANTOPRAZOLE SODIUM 40 MG: 40 TABLET, DELAYED RELEASE ORAL at 06:05

## 2024-05-15 RX ADMIN — PREGABALIN 50 MG: 25 CAPSULE ORAL at 08:05

## 2024-05-15 RX ADMIN — MUPIROCIN 1 G: 20 OINTMENT TOPICAL at 08:05

## 2024-05-15 RX ADMIN — ASPIRIN 81 MG: 81 TABLET, COATED ORAL at 08:05

## 2024-05-15 NOTE — NURSING
Discharge instructions given to pt, pt verbalized understanding. Explained to pt in depth sign and symptoms of adverse problems and to go to nearest ER if experienced, pt v/u  jwaiting for [facility] pharmacy to bring patients meds.

## 2024-05-15 NOTE — PLAN OF CARE
Hospital follow up appointment requested from [facility] clinic - awaiting response    Inbasket message sent to Dr. Coffman's clinic requesting hospital follow up appointment - clinic to contact patient with appointment date/time     05/15/24 7807   Post-Acute Status   Post-Acute Authorization Other   Other Status Awaiting f/u Appts

## 2024-05-15 NOTE — RESPIRATORY THERAPY
05/14/24 1945   Patient Assessment/Suction   Level of Consciousness (AVPU) alert   Respiratory Effort Normal;Unlabored   PRE-TX-O2   Device (Oxygen Therapy) nasal cannula   $ Is the patient on Low Flow Oxygen? Yes   Flow (L/min) (Oxygen Therapy) 4   SpO2 99 %   Pulse Oximetry Type Continuous   $ Pulse Oximetry - Multiple Charge Pulse Oximetry - Multiple   Pulse 84   Resp (!) 23   Education   $ Education 15 min

## 2024-05-15 NOTE — PLAN OF CARE
Discharge orders and chart reviewed. No other discharge needs noted at this time. Pt is clear for discharge from case management. Pt is discharging to home.    Hospital follow up appointment scheduled with Dr. Coffman     05/15/24 5596   Final Note   Assessment Type Final Discharge Note   Anticipated Discharge Disposition Home   What phone number can be called within the next 1-3 days to see how you are doing after discharge? [phone]   Hospital Resources/Appts/Education Provided Appointments scheduled and added to AVS   Post-Acute Status   Discharge Delays None known at this time

## 2024-05-15 NOTE — TELEPHONE ENCOUNTER
----- Message from Darlene Aguirre RN sent at 5/15/2024  9:35 AM CDT -----  Good Morning    Patient brought to cath lab with Dr. Coffman for stent placement. Patient discharging from [facility] on today. Please schedule hospital follow up appointment and contact patient with appointment date/time.     Thank you  Darlene Aguirre RN CM

## 2024-05-15 NOTE — DISCHARGE SUMMARY
[facility]  Discharge Summary      Patient Name: Sherry Anna  MRN: [id number]  COCO: [id number]  Patient Class: IP- Inpatient  Admission Date: 5/14/2024  Hospital Length of Stay: 1 days  Discharge Date and Time: 5/15/2024 11:33 AM  Attending Physician: Daniel Dotson MD   Discharging Provider: Daniel Dotson MD  Primary Care Provider: Jesse Leggett MD    Primary Care Team: Networked reference to record PCT     HPI:   59F with PMH HTN, HLD, GERD, anxiety/depression, and osteoarthritis presented for chest pain that woke her from sleep found to have STEMI. She was taken emergently to cath lab and had a stent placed to her LAD where there was a 99% blockage. There were other stenoses reported as well to be managed medically. She had great resolution of symptoms after stent was placed. She is seen post-cath in ICU reporting feeling well with family bedside. Admitted to hospital medicine for continued management.    Procedure(s) (LRB):  ANGIOGRAM, CORONARY, INCLUDING BYPASS GRAFT, WITH LEFT HEART CATHETERIZATION (N/A)  Percutaneous coronary intervention (N/A)      Hospital Course:   Admitted for STEMI. Had stent placed to LAD. Did well post-cath. Started on DAPT and her statin was increased. Celebrex and adipex dc'd. Cardiology followed. Discharged the following day. To f/u with PCP and cardiology. By time of discharge the patient was tolerating a regular diet with resolved admission symptoms. Patient seen and examined on day of discharge.    Physical exam on day of discharge:  General - Patient alert and oriented x3 in NAD  CV - Regular rate and rhythm  Resp - Lungs CTA Bilaterally, No increased WOB  Extrem-  No cyanosis, clubbing, edema.   Skin -  No masses, rashes or lesions noted on cursory skin exam.       Goals of Care Treatment Preferences:  Code Status: Full Code      Consults:     No new Assessment & Plan notes have been filed under this hospital service since the last note  was generated.  Service: Hospital Medicine    Final Active Diagnoses:    Diagnosis Date Noted POA    PRINCIPAL PROBLEM:  STEMI (ST elevation myocardial infarction) [I21.3] 05/14/2024 Yes    Cigarette nicotine dependence without complication [F17.210] 05/14/2024 Yes    HTN (hypertension) [I10] 05/14/2024 Yes    HLD (hyperlipidemia) [E78.5] 05/14/2024 Yes    Anxiety and depression [F41.9, F32.A] 05/14/2024 Yes    Osteoarthritis [M19.90] 05/14/2024 Yes      Problems Resolved During this Admission:       Discharged Condition: good    Disposition: Home or Self Care    Follow Up:   Follow-up Information       Isai Coffman MD. Schedule an appointment as soon as possible for a visit in 2 week(s).    Specialties: Interventional Cardiology, Cardiovascular Disease, Cardiology  Why: message sent to Dr. Coffman's clinic requesting hospital follow up appointment - clinic to contact patient with appointment date/time  Contact information:  [address]  [address]  [place] [facility] [address]  [phone]               [facility]. Go on 5/24/2024.    Specialty: Primary Care  Why: Hospital follow up scheduled at 11:00 a.m. on 5/24.  Contact information:  [address], [facility] [address]  [facility] [id number] [phone]  Additional information:  [address]                         Patient Instructions:      Ambulatory referral/consult to Smoking Cessation Program   Standing Status: Future   Referral Priority: Routine Referral Type: Consultation   Referral Reason: Specialty Services Required   Requested Specialty: CTTS   Number of Visits Requested: 1     Diet Cardiac     Notify your health care provider if you experience any of the following:  temperature >100.4     Notify your health care provider if you experience any of the following:  persistent nausea and vomiting or diarrhea     Notify your health care provider if you experience any of the following:  severe uncontrolled pain      Notify your health care provider if you experience any of the following:  redness, tenderness, or signs of infection (pain, swelling, redness, odor or green/yellow discharge around incision site)     Notify your health care provider if you experience any of the following:  difficulty breathing or increased cough     Notify your health care provider if you experience any of the following:  severe persistent headache     Notify your health care provider if you experience any of the following:  worsening rash     Notify your health care provider if you experience any of the following:  persistent dizziness, light-headedness, or visual disturbances     Notify your health care provider if you experience any of the following:  increased confusion or weakness     Activity as tolerated       Significant Diagnostic Studies:     Lab Results   Component Value Date    WBC 8.38 05/15/2024    HGB 12.5 05/15/2024    HCT 40.0 05/15/2024    MCV 94 05/15/2024     05/15/2024       BMP  Lab Results   Component Value Date     05/15/2024    K 4.2 05/15/2024     05/15/2024    CO2 26 05/15/2024    BUN 17 05/15/2024    CREATININE 0.8 05/15/2024    CALCIUM 8.5 (L) 05/15/2024    ANIONGAP 5 (L) 05/15/2024    EGFRNORACEVR >60.0 05/15/2024       Echo    Result Date: 5/15/2024    Left Ventricle: The left ventricle is normal in size. Normal wall thickness. See diagram for wall motion findings.   Anterior apical hypokinesis There is low normal systolic function. Ejection fraction by visual approximation is 50%. Grade I diastolic dysfunction.   Right Ventricle: Normal right ventricular cavity size. Wall thickness is normal. Systolic function is normal.   Left Atrium: Left atrium is mildly dilated.   Mitral Valve: There is mild regurgitation with a centrally directed jet.   IVC/SVC: Normal venous pressure at 3 mmHg.     Cardiac catheterization    Result Date: 5/14/2024    The Ost LAD lesion was 50% stenosed.   The Mid LAD  lesion was 99% stenosed with 0% stenosis post-intervention.   Mid LAD lesion: A .   Mid LAD lesion: A STENT FRONTIER DIVINE [id number] stent was successfully placed at 12 LANDON for 30 sec.   The estimated blood loss was <50 mL.   There was single vessel coronary artery disease. The procedure log was documented by Documenter: Benita Bates RN and verified by Isai Coffman MD. Date: 5/14/2024  Time: 7:14 AM     X-Ray Chest AP Portable    Result Date: 5/14/2024  EXAMINATION: XR CHEST AP PORTABLE CLINICAL HISTORY: chest pain; TECHNIQUE: Single frontal view of the chest was performed. COMPARISON: None FINDINGS: Cardiac monitoring leads and cardiac pacing/defibrillator pads project over the chest.  There is mild prominence of the cardiomediastinal silhouette.  Mediastinal structures are midline.  Are symmetrically expanded without evidence of confluent airspace consolidation.  No substantial volume of pleural fluid or pneumothorax identified.  Osseous structures intact with degenerative change and postoperative change of the lower cervical spine.     Please see above. Electronically signed by: Ama Rao MD Date:    05/14/2024 Time:    04:58         Pending Diagnostic Studies:       None           Medications:  Reconciled Home Medications:      Medication List        START taking these medications      aspirin 81 MG EC tablet  Commonly known as: ECOTRIN  Take 1 tablet (81 mg total) by mouth once daily.  Start taking on: May 16, 2024     clopidogreL 75 mg tablet  Commonly known as: PLAVIX  Take 1 tablet (75 mg total) by mouth once daily.  Start taking on: May 16, 2024     nicotine 14 mg/24 hr  Commonly known as: NICODERM CQ  Place 1 patch onto the skin once daily.            CONTINUE taking these medications      atorvastatin 20 MG tablet  Commonly known as: LIPITOR  Take 20 mg by mouth once daily.     carvediloL 25 MG tablet  Commonly known as: COREG  Take 25 mg by mouth 2 (two) times daily.     cyclobenzaprine 10 MG  tablet  Commonly known as: FLEXERIL  Take 10 mg by mouth every 8 (eight) hours as needed for Muscle spasms.     DULoxetine 60 MG capsule  Commonly known as: CYMBALTA  Take 60 mg by mouth 2 (two) times daily.     omeprazole 40 MG capsule  Commonly known as: PRILOSEC  Take 40 mg by mouth every morning.     oxyCODONE-acetaminophen  mg per tablet  Commonly known as: PERCOCET  Take 1 tablet by mouth every 8 (eight) hours.     pregabalin 50 MG capsule  Commonly known as: LYRICA  Take 50 mg by mouth 2 (two) times daily.     spironolactone 25 MG tablet  Commonly known as: ALDACTONE  Take 25 mg by mouth once daily.     sumatriptan 100 MG tablet  Commonly known as: IMITREX  Take 50 mg by mouth every 2 (two) hours as needed for Migraine.     valsartan 160 MG tablet  Commonly known as: DIOVAN  Take 160 mg by mouth once daily.            STOP taking these medications      celecoxib 200 MG capsule  Commonly known as: CeleBREX     phentermine 37.5 mg tablet  Commonly known as: ADIPEX-P              Indwelling Lines/Drains at time of discharge:   Lines/Drains/Airways       None                   Time spent on the discharge of patient: 36 minutes        Daniel Dotson MD  Department of Hospital Medicine  [facility]

## 2024-05-15 NOTE — HOSPITAL COURSE
Admitted for STEMI. Had stent placed to LAD. Did well post-cath. Started on DAPT and her statin was increased. Celebrex and adipex dc'd. Cardiology followed. Discharged the following day. To f/u with PCP and cardiology. By time of discharge the patient was tolerating a regular diet with resolved admission symptoms. Patient seen and examined on day of discharge.    Physical exam on day of discharge:  General - Patient alert and oriented x3 in NAD  CV - Regular rate and rhythm  Resp - Lungs CTA Bilaterally, No increased WOB  Extrem-  No cyanosis, clubbing, edema.   Skin -  No masses, rashes or lesions noted on cursory skin exam.

## 2024-05-24 ENCOUNTER — OFFICE VISIT (OUTPATIENT)
Dept: PRIMARY CARE CLINIC | Facility: CLINIC | Age: 59
End: 2024-05-24

## 2024-05-24 VITALS
BODY MASS INDEX: 34.57 KG/M2 | WEIGHT: 195.13 LBS | OXYGEN SATURATION: 96 % | SYSTOLIC BLOOD PRESSURE: 100 MMHG | HEART RATE: 89 BPM | HEIGHT: 63 IN | TEMPERATURE: 98 F | DIASTOLIC BLOOD PRESSURE: 64 MMHG

## 2024-05-24 DIAGNOSIS — Z09 HOSPITAL DISCHARGE FOLLOW-UP: Primary | ICD-10-CM

## 2024-05-24 DIAGNOSIS — R19.7 DIARRHEA, UNSPECIFIED TYPE: ICD-10-CM

## 2024-05-24 DIAGNOSIS — I21.02 ST ELEVATION MYOCARDIAL INFARCTION INVOLVING LEFT ANTERIOR DESCENDING (LAD) CORONARY ARTERY: ICD-10-CM

## 2024-05-24 PROCEDURE — 99213 OFFICE O/P EST LOW 20 MIN: CPT | Mod: S$GLB,,, | Performed by: NURSE PRACTITIONER

## 2024-05-24 PROCEDURE — 99999 PR PBB SHADOW E&M-EST. PATIENT-LVL V: CPT | Mod: PBBFAC,,, | Performed by: NURSE PRACTITIONER

## 2024-05-24 RX ORDER — NITROGLYCERIN 0.4 MG/1
0.4 TABLET SUBLINGUAL EVERY 5 MIN PRN
Qty: 30 TABLET | Refills: 0 | Status: SHIPPED | OUTPATIENT
Start: 2024-05-24 | End: 2024-06-04

## 2024-05-24 NOTE — PATIENT INSTRUCTIONS
Check BP before taking medication and hold valsartan if <110    Keep log of BP three times a day to bring to cardiology    Bring blood pressure cuff to Dr malik to have them check against their readings

## 2024-05-24 NOTE — PROGRESS NOTES
Transitional Care Note  Subjective:       Patient ID: Sherry Anna is a 59 y.o. female.  Chief Complaint: Hospital Follow Up    Family and/or Caretaker present at visit?  Yes.  Diagnostic tests reviewed/disposition: No diagnosic tests pending after this hospitalization.  Disease/illness education: blood pressure management  Home health/community services discussion/referrals: Patient does not have home health established from hospital visit.  They do not need home health.  If needed, we will set up home health for the patient.   Establishment or re-establishment of referral orders for community resources:  referral to GI and outpatient case management placed .   Discussion with other health care providers: No discussion with other health care providers necessary.   Sherry Anna is a 59 year old female with a past medical history of  HTN, HLD, GERD, anxiety/depression, and osteoarthritis who presents for hospital follow up. Patient was admitted with chest pain that woke her from sleep and found to have STEMI. She was taken emergently to cath lab and had a stent placed to her LAD where there was a 99% blockage. Patient states she experienced one episode of chest pain last week that has since resolved and denies further episodes. She states she has been taking medications as prescribed. Patient admits to feeling increased fatigue since discharge and states it is improving slowly. She states she has been experiencing chronic intermittent diarrhea/constipation for the past several months and has not seen GI or informed her PCP. No blood in stool       Review of Systems   Constitutional:  Positive for fatigue. Negative for activity change, appetite change, chills and fever.   HENT:  Negative for congestion, sore throat and trouble swallowing.    Eyes:  Negative for photophobia and visual disturbance.   Respiratory:  Negative for cough, chest tightness and shortness of breath.    Cardiovascular:  Negative for chest  pain, palpitations and leg swelling.   Gastrointestinal:  Negative for abdominal pain, diarrhea and nausea.   Genitourinary:  Negative for dysuria, flank pain and hematuria.   Musculoskeletal:  Negative for back pain.   Neurological:  Negative for dizziness, weakness and headaches.   Psychiatric/Behavioral:  Negative for confusion.        Objective:      Physical Exam  Vitals reviewed.   Constitutional:       Appearance: Normal appearance. She is obese.   HENT:      Head: Normocephalic.   Cardiovascular:      Rate and Rhythm: Normal rate and regular rhythm.      Pulses: Normal pulses.   Pulmonary:      Effort: Pulmonary effort is normal.      Breath sounds: Normal breath sounds.   Abdominal:      Palpations: Abdomen is soft.   Musculoskeletal:         General: Normal range of motion.      Cervical back: Normal range of motion.   Skin:     General: Skin is warm and dry.   Neurological:      Mental Status: She is alert and oriented to person, place, and time. Mental status is at baseline.   Psychiatric:         Mood and Affect: Mood normal.         Assessment:       1. Hospital discharge follow-up    2. ST elevation myocardial infarction involving left anterior descending (LAD) coronary artery        Plan:       Patient mildly hypotensive upon arrival to clinic. She states she has been trying to drink more water lately and denies feeling dizzy or short of breath.    Patient has follow up with cardiology scheduled for June 4th & PCP 6/6  -Continue to take medications as prescribed and keep blood pressure log as discussed.   -Return to ED precautions discussed  -Referral placed to GI and outpatient case management.

## 2024-06-03 ENCOUNTER — PATIENT OUTREACH (OUTPATIENT)
Dept: ADMINISTRATIVE | Facility: OTHER | Age: 59
End: 2024-06-03
Payer: COMMERCIAL

## 2024-06-04 ENCOUNTER — OFFICE VISIT (OUTPATIENT)
Dept: CARDIOLOGY | Facility: CLINIC | Age: 59
End: 2024-06-04
Payer: COMMERCIAL

## 2024-06-04 VITALS
DIASTOLIC BLOOD PRESSURE: 80 MMHG | OXYGEN SATURATION: 97 % | WEIGHT: 192.44 LBS | HEART RATE: 97 BPM | BODY MASS INDEX: 34.1 KG/M2 | HEIGHT: 63 IN | SYSTOLIC BLOOD PRESSURE: 140 MMHG

## 2024-06-04 DIAGNOSIS — I10 PRIMARY HYPERTENSION: ICD-10-CM

## 2024-06-04 DIAGNOSIS — F17.210 CIGARETTE NICOTINE DEPENDENCE WITHOUT COMPLICATION: ICD-10-CM

## 2024-06-04 DIAGNOSIS — E78.00 HYPERCHOLESTEROLEMIA: ICD-10-CM

## 2024-06-04 DIAGNOSIS — I21.02 ST ELEVATION MYOCARDIAL INFARCTION INVOLVING LEFT ANTERIOR DESCENDING (LAD) CORONARY ARTERY: Primary | ICD-10-CM

## 2024-06-04 DIAGNOSIS — E78.2 MIXED HYPERLIPIDEMIA: ICD-10-CM

## 2024-06-04 DIAGNOSIS — I25.10 CORONARY ARTERY DISEASE INVOLVING NATIVE CORONARY ARTERY OF NATIVE HEART WITHOUT ANGINA PECTORIS: ICD-10-CM

## 2024-06-04 PROCEDURE — 99214 OFFICE O/P EST MOD 30 MIN: CPT | Mod: S$GLB,,, | Performed by: INTERNAL MEDICINE

## 2024-06-04 PROCEDURE — 3044F HG A1C LEVEL LT 7.0%: CPT | Mod: CPTII,S$GLB,, | Performed by: INTERNAL MEDICINE

## 2024-06-04 PROCEDURE — 1111F DSCHRG MED/CURRENT MED MERGE: CPT | Mod: CPTII,S$GLB,, | Performed by: INTERNAL MEDICINE

## 2024-06-04 PROCEDURE — 3008F BODY MASS INDEX DOCD: CPT | Mod: CPTII,S$GLB,, | Performed by: INTERNAL MEDICINE

## 2024-06-04 PROCEDURE — 3079F DIAST BP 80-89 MM HG: CPT | Mod: CPTII,S$GLB,, | Performed by: INTERNAL MEDICINE

## 2024-06-04 PROCEDURE — 1159F MED LIST DOCD IN RCRD: CPT | Mod: CPTII,S$GLB,, | Performed by: INTERNAL MEDICINE

## 2024-06-04 PROCEDURE — 99999 PR PBB SHADOW E&M-EST. PATIENT-LVL IV: CPT | Mod: PBBFAC,,, | Performed by: INTERNAL MEDICINE

## 2024-06-04 PROCEDURE — 3077F SYST BP >= 140 MM HG: CPT | Mod: CPTII,S$GLB,, | Performed by: INTERNAL MEDICINE

## 2024-06-04 PROCEDURE — 1160F RVW MEDS BY RX/DR IN RCRD: CPT | Mod: CPTII,S$GLB,, | Performed by: INTERNAL MEDICINE

## 2024-06-04 PROCEDURE — 4010F ACE/ARB THERAPY RXD/TAKEN: CPT | Mod: CPTII,S$GLB,, | Performed by: INTERNAL MEDICINE

## 2024-06-04 RX ORDER — EZETIMIBE 10 MG/1
10 TABLET ORAL DAILY
Qty: 90 TABLET | Refills: 3 | Status: SHIPPED | OUTPATIENT
Start: 2024-06-04 | End: 2024-06-21 | Stop reason: SDUPTHER

## 2024-06-04 RX ORDER — NICOTINE 7MG/24HR
1 PATCH, TRANSDERMAL 24 HOURS TRANSDERMAL DAILY
Qty: 30 PATCH | Refills: 0 | Status: SHIPPED | OUTPATIENT
Start: 2024-06-04 | End: 2024-06-21 | Stop reason: SDUPTHER

## 2024-06-04 NOTE — PROGRESS NOTES
Patient ID:  Sherry Anna is a 59 y.o. female who presents for follow-up of Hospital Follow Up      STEMI  Coronary artery disease subtotal occlusion of the midportion of the left anterior descending artery  Status post percutaneous revascularization of the LAD  Hypertension  Tobacco dependence    She was admitted on 05/14 with a STEMI she had totally occlusion of the left anterior descending artery a drug-eluting stent was placed.  She denies any further chest pains she has noticed a couple of times of irregular heartbeats.  She denies any dizziness chest pains.  BP of 128/89 pulse of 91 with her machine 120/78 by me.          Past Medical History:   Diagnosis Date    Arthritis     Heart disease     Hyperlipidemia     Hypertension     ST elevation (STEMI) myocardial infarction of unspecified site         Past Surgical History:   Procedure Laterality Date    CORONARY ANGIOGRAPHY INCLUDING BYPASS GRAFTS WITH CATHETERIZATION OF LEFT HEART N/A 5/14/2024    Procedure: ANGIOGRAM, CORONARY, INCLUDING BYPASS GRAFT, WITH LEFT HEART CATHETERIZATION;  Surgeon: Isai Coffman MD;  Location: [facility] CATH/EP LAB;  Service: Cardiology;  Laterality: N/A;    PERCUTANEOUS CORONARY INTERVENTION, ARTERY N/A 5/14/2024    Procedure: Percutaneous coronary intervention;  Surgeon: Isai Coffman MD;  Location: [facility] CATH/EP LAB;  Service: Cardiology;  Laterality: N/A;          Current Outpatient Medications   Medication Instructions    aspirin (ECOTRIN) 81 mg, Oral, Daily    atorvastatin (LIPITOR) 20 mg, Oral, Daily    carvediloL (COREG) 25 mg, Oral, 2 times daily    clopidogreL (PLAVIX) 75 mg, Oral, Daily    cyclobenzaprine (FLEXERIL) 10 mg, Oral, Every 8 hours PRN    DULoxetine (CYMBALTA) 60 mg, Oral, 2 times daily    ezetimibe (ZETIA) 10 mg, Oral, Daily    nicotine (NICODERM CQ) 14 mg/24 hr 1 patch, Transdermal, Daily    nicotine (NICODERM CQ) 7 mg/24 hr 1 patch, Transdermal, Daily    nitroGLYCERIN (NITROSTAT) 0.4 mg,  "Sublingual, Every 5 min PRN    omeprazole (PRILOSEC) 40 mg, Oral, Every morning    oxyCODONE-acetaminophen (PERCOCET)  mg per tablet 1 tablet, Oral, Every 8 hours    pregabalin (LYRICA) 50 mg, Oral, 2 times daily    spironolactone (ALDACTONE) 25 mg, Oral, Daily    sumatriptan (IMITREX) 50 mg, Oral, Every 2 hours PRN    valsartan (DIOVAN) 160 mg, Oral, Daily        Review of patient's allergies indicates:   Allergen Reactions    Codeine Itching        Review of Systems   Cardiovascular:  Negative for chest pain, dyspnea on exertion, leg swelling and palpitations.   Respiratory:  Negative for cough and shortness of breath.         Objective:     Vitals:    06/04/24 1423   BP: (!) 140/80   BP Location: Left arm   Patient Position: Sitting   BP Method: Medium (Manual)   Pulse: 97   SpO2: 97%   Weight: 87.3 kg (192 lb 7.4 oz)   Height: 5' 3" (1.6 m)       Physical Exam  Vitals and nursing note reviewed.   Constitutional:       Appearance: She is well-developed.   HENT:      Head: Normocephalic and atraumatic.   Eyes:      Conjunctiva/sclera: Conjunctivae normal.   Cardiovascular:      Rate and Rhythm: Normal rate and regular rhythm.      Heart sounds: Normal heart sounds.   Pulmonary:      Effort: Pulmonary effort is normal.      Breath sounds: Normal breath sounds.   Abdominal:      General: Bowel sounds are normal.      Palpations: Abdomen is soft.   Musculoskeletal:         General: Normal range of motion.   Skin:     General: Skin is warm and dry.   Neurological:      Mental Status: She is alert and oriented to person, place, and time.   Psychiatric:         Behavior: Behavior normal.         Thought Content: Thought content normal.         Judgment: Judgment normal.       CMP  Sodium   Date Value Ref Range Status   05/15/2024 138 136 - 145 mmol/L Final     Potassium   Date Value Ref Range Status   05/15/2024 4.2 3.5 - 5.1 mmol/L Final     Chloride   Date Value Ref Range Status   05/15/2024 107 95 - 110 mmol/L " Final     CO2   Date Value Ref Range Status   05/15/2024 26 23 - 29 mmol/L Final     Glucose   Date Value Ref Range Status   05/15/2024 122 (H) 70 - 110 mg/dL Final     BUN   Date Value Ref Range Status   05/15/2024 17 8 - 23 mg/dL Final     Creatinine   Date Value Ref Range Status   05/15/2024 0.8 0.5 - 1.4 mg/dL Final     Calcium   Date Value Ref Range Status   05/15/2024 8.5 (L) 8.7 - 10.5 mg/dL Final     Total Protein   Date Value Ref Range Status   05/14/2024 6.7 6.0 - 8.4 g/dL Final     Albumin   Date Value Ref Range Status   05/14/2024 4.1 3.5 - 5.2 g/dL Final     Total Bilirubin   Date Value Ref Range Status   05/14/2024 0.3 0.1 - 1.0 mg/dL Final     Comment:     For infants and newborns, interpretation of results should be based  on gestational age, weight and in agreement with clinical  observations.    Premature Infant recommended reference ranges:  Up to 24 hours.............<8.0 mg/dL  Up to 48 hours............<12.0 mg/dL  3-5 days..................<15.0 mg/dL  6-29 days.................<15.0 mg/dL       Alkaline Phosphatase   Date Value Ref Range Status   05/14/2024 88 55 - 135 U/L Final     AST   Date Value Ref Range Status   05/14/2024 25 10 - 40 U/L Final     ALT   Date Value Ref Range Status   05/14/2024 28 10 - 44 U/L Final     Anion Gap   Date Value Ref Range Status   05/15/2024 5 (L) 8 - 16 mmol/L Final      BMP  Lab Results   Component Value Date     05/15/2024    K 4.2 05/15/2024     05/15/2024    CO2 26 05/15/2024    BUN 17 05/15/2024    CREATININE 0.8 05/15/2024    CALCIUM 8.5 (L) 05/15/2024    ANIONGAP 5 (L) 05/15/2024      BNP  @LABRCNTIP(BNP,BNPTRIAGEBLO)@   Lab Results   Component Value Date    CHOL 183 05/14/2024     Lab Results   Component Value Date    HDL 49 05/14/2024     Lab Results   Component Value Date    LDLCALC 124.8 05/14/2024     Lab Results   Component Value Date    TRIG 46 05/14/2024     Lab Results   Component Value Date    CHOLHDL 26.8 05/14/2024      No  "results found for: "TSH", "T6XEFKJ", "W2QIOJC", "THYROIDAB", "FREET4"  Lab Results   Component Value Date    HGBA1C 6.0 05/14/2024     Lab Results   Component Value Date    WBC 8.38 05/15/2024    HGB 12.5 05/15/2024    HCT 40.0 05/15/2024    MCV 94 05/15/2024     05/15/2024         Results for orders placed during the hospital encounter of 05/14/24    Echo    Interpretation Summary    Left Ventricle: The left ventricle is normal in size. Normal wall thickness. See diagram for wall motion findings.   Anterior apical hypokinesis There is low normal systolic function. Ejection fraction by visual approximation is 50%. Grade I diastolic dysfunction.    Right Ventricle: Normal right ventricular cavity size. Wall thickness is normal. Systolic function is normal.    Left Atrium: Left atrium is mildly dilated.    Mitral Valve: There is mild regurgitation with a centrally directed jet.    IVC/SVC: Normal venous pressure at 3 mmHg.     Results for orders placed during the hospital encounter of 05/14/24    Cardiac catheterization    Conclusion    The Ost LAD lesion was 50% stenosed.    The Mid LAD lesion was 99% stenosed with 0% stenosis post-intervention.    Mid LAD lesion: A .    Mid LAD lesion: A STENT FRONTIER DIVINE [id number] stent was successfully placed at 12 [place] for 30 sec.    The estimated blood loss was <50 mL.    There was single vessel coronary artery disease.    The procedure log was documented by Documenter: Benita Bates RN and verified by Isai Coffman MD.    Date: 5/14/2024  Time: 7:14 AM     EKG  Results for orders placed or performed during the hospital encounter of 05/14/24   EKG 12-lead    Collection Time: 05/14/24  4:25 AM   Result Value Ref Range    QRS Duration 94 ms    [facility] QTC Calculation 431 ms    Narrative    Test Reason : R07.9,    Vent. Rate : 072 BPM     Atrial Rate : 072 BPM     P-R Int : 162 ms          QRS Dur : 094 ms      QT Int : 394 ms       P-R-T Axes : 033 068 016 degrees     " QTc Int : 431 ms    Normal sinus rhythm  ST elevation consider anterior injury or acute infarct    ACUTE MI / STEMI    Abnormal ECG  When compared with ECG of 14-MAY-2024 04:24,  No significant change was found  Confirmed by Roe Huber MD ([id number]) on 6/8/2024 1:41:09 PM    Referred By:             Confirmed By:Roe Huber MD      Stress  No results found for this or any previous visit.             Assessment:       Coronary artery disease involving native coronary artery of native heart without angina pectoris  No symptoms of angina    HTN (hypertension)  Controlled on valsartan 160 mg carvedilol 25 mg p.o. b.i.d. spironolactone 25 mg daily    Hyperlipidemia  LDL of 124 in spite of 20 mg of atorvastatin.  Will add Zetia 10 mg p.o. daily    Cigarette nicotine dependence without complication  Dangers of cigarette smoking were reviewed with patient in detail. Patient was Counseled for 3-10 minutes. Nicotine replacement options were discussed. Nicotine replacement was discussed- prescribed       Plan:           She is to follow low-cholesterol diet.  She is to continue the atorvastatin and add Zetia 10 mg p.o. daily.  It was explained to the patient that the goal is an LDL cholesterol in the 70 range.  She is to continue her nicotine for smoking cessation and importance was discussed with the patient.  She will be seen in the office in 3 months with a lipid profile

## 2024-06-08 LAB
OHS QRS DURATION: 94 MS
OHS QTC CALCULATION: 431 MS

## 2024-06-11 NOTE — PROGRESS NOTES
CHW - Outreach Attempt    Cristy Gibson Community Health Worker left a voicemail message for 2nd attempt to contact patient regarding: [facility]   Community Health Worker to attempt to contact patient on:  June 13, 2024.

## 2024-06-13 ENCOUNTER — PATIENT OUTREACH (OUTPATIENT)
Dept: ADMINISTRATIVE | Facility: OTHER | Age: 59
End: 2024-06-13
Payer: COMMERCIAL

## 2024-06-21 DIAGNOSIS — I21.02 ST ELEVATION MYOCARDIAL INFARCTION INVOLVING LEFT ANTERIOR DESCENDING (LAD) CORONARY ARTERY: ICD-10-CM

## 2024-06-21 DIAGNOSIS — E78.00 HYPERCHOLESTEROLEMIA: ICD-10-CM

## 2024-06-21 RX ORDER — EZETIMIBE 10 MG/1
10 TABLET ORAL DAILY
Qty: 90 TABLET | Refills: 3 | Status: SHIPPED | OUTPATIENT
Start: 2024-06-21 | End: 2025-06-21

## 2024-06-21 RX ORDER — NICOTINE 7MG/24HR
1 PATCH, TRANSDERMAL 24 HOURS TRANSDERMAL DAILY
Qty: 28 PATCH | Refills: 0 | Status: SHIPPED | OUTPATIENT
Start: 2024-06-21

## 2024-06-23 PROBLEM — I25.10 CORONARY ARTERY DISEASE INVOLVING NATIVE CORONARY ARTERY OF NATIVE HEART WITHOUT ANGINA PECTORIS: Status: ACTIVE | Noted: 2024-06-23

## (undated) DEVICE — CATH LNCHR EB35 6F 110CM

## (undated) DEVICE — SHEATH INTRODUCER 6FR 11CM

## (undated) DEVICE — CATH DXTERITY JL40 100CM 6FR

## (undated) DEVICE — Device

## (undated) DEVICE — GUIDE WIRE BMW 014 X190

## (undated) DEVICE — GUIDE LAUNCHER 6FR JL 3.5

## (undated) DEVICE — GUIDEWIRE RUNTHROUGH EF 180CM

## (undated) DEVICE — KIT INFLATION MERIT

## (undated) DEVICE — BLLN SC EUPHORA RX 2.00MMX20MM

## (undated) DEVICE — CATH DXTERITY JR40 100CM 6FR

## (undated) DEVICE — GUIDEWIRE J 3MM .035IN 150